# Patient Record
Sex: FEMALE | Race: WHITE | Employment: OTHER | URBAN - METROPOLITAN AREA
[De-identification: names, ages, dates, MRNs, and addresses within clinical notes are randomized per-mention and may not be internally consistent; named-entity substitution may affect disease eponyms.]

---

## 2023-09-11 ENCOUNTER — OFFICE VISIT (OUTPATIENT)
Dept: GERIATRIC MEDICINE | Age: 88
End: 2023-09-11
Payer: MEDICARE

## 2023-09-11 DIAGNOSIS — R53.1 WEAKNESS: ICD-10-CM

## 2023-09-11 DIAGNOSIS — F03.90 DEMENTIA WITHOUT BEHAVIORAL DISTURBANCE (HCC): ICD-10-CM

## 2023-09-11 DIAGNOSIS — G47.33 OSA (OBSTRUCTIVE SLEEP APNEA): ICD-10-CM

## 2023-09-11 DIAGNOSIS — I15.9 SECONDARY HYPERTENSION: ICD-10-CM

## 2023-09-11 DIAGNOSIS — N39.0 URINARY TRACT INFECTION WITHOUT HEMATURIA, SITE UNSPECIFIED: Primary | ICD-10-CM

## 2023-09-11 PROCEDURE — 1123F ACP DISCUSS/DSCN MKR DOCD: CPT | Performed by: INTERNAL MEDICINE

## 2023-09-11 PROCEDURE — 99306 1ST NF CARE HIGH MDM 50: CPT | Performed by: INTERNAL MEDICINE

## 2023-09-12 ENCOUNTER — OFFICE VISIT (OUTPATIENT)
Dept: GERIATRIC MEDICINE | Age: 88
End: 2023-09-12
Payer: MEDICARE

## 2023-09-12 DIAGNOSIS — I15.9 SECONDARY HYPERTENSION: ICD-10-CM

## 2023-09-12 DIAGNOSIS — G47.33 OSA (OBSTRUCTIVE SLEEP APNEA): ICD-10-CM

## 2023-09-12 DIAGNOSIS — R53.1 WEAKNESS: ICD-10-CM

## 2023-09-12 DIAGNOSIS — F03.90 DEMENTIA WITHOUT BEHAVIORAL DISTURBANCE (HCC): ICD-10-CM

## 2023-09-12 DIAGNOSIS — N39.0 URINARY TRACT INFECTION WITHOUT HEMATURIA, SITE UNSPECIFIED: Primary | ICD-10-CM

## 2023-09-12 PROCEDURE — 1123F ACP DISCUSS/DSCN MKR DOCD: CPT | Performed by: INTERNAL MEDICINE

## 2023-09-12 PROCEDURE — 99308 SBSQ NF CARE LOW MDM 20: CPT | Performed by: INTERNAL MEDICINE

## 2023-09-12 RX ORDER — METOPROLOL SUCCINATE 25 MG/1
25 TABLET, EXTENDED RELEASE ORAL DAILY
COMMUNITY

## 2023-09-12 RX ORDER — NITROFURANTOIN 25; 75 MG/1; MG/1
100 CAPSULE ORAL 2 TIMES DAILY
COMMUNITY

## 2023-09-12 RX ORDER — OMEPRAZOLE 20 MG/1
20 CAPSULE, DELAYED RELEASE ORAL DAILY
COMMUNITY

## 2023-09-12 RX ORDER — LANOLIN ALCOHOL/MO/W.PET/CERES
1000 CREAM (GRAM) TOPICAL DAILY
COMMUNITY

## 2023-09-13 ENCOUNTER — OFFICE VISIT (OUTPATIENT)
Dept: GERIATRIC MEDICINE | Age: 88
End: 2023-09-13
Payer: MEDICARE

## 2023-09-13 DIAGNOSIS — R53.1 WEAKNESS: ICD-10-CM

## 2023-09-13 DIAGNOSIS — I15.9 SECONDARY HYPERTENSION: ICD-10-CM

## 2023-09-13 DIAGNOSIS — N39.0 URINARY TRACT INFECTION WITHOUT HEMATURIA, SITE UNSPECIFIED: Primary | ICD-10-CM

## 2023-09-13 DIAGNOSIS — F03.90 DEMENTIA WITHOUT BEHAVIORAL DISTURBANCE (HCC): ICD-10-CM

## 2023-09-13 DIAGNOSIS — G47.33 OSA (OBSTRUCTIVE SLEEP APNEA): ICD-10-CM

## 2023-09-13 DIAGNOSIS — U07.1 COVID-19: ICD-10-CM

## 2023-09-13 PROCEDURE — 99309 SBSQ NF CARE MODERATE MDM 30: CPT | Performed by: INTERNAL MEDICINE

## 2023-09-13 PROCEDURE — 1123F ACP DISCUSS/DSCN MKR DOCD: CPT | Performed by: INTERNAL MEDICINE

## 2023-09-14 ENCOUNTER — OFFICE VISIT (OUTPATIENT)
Dept: GERIATRIC MEDICINE | Age: 88
End: 2023-09-14

## 2023-09-14 DIAGNOSIS — F03.90 DEMENTIA WITHOUT BEHAVIORAL DISTURBANCE (HCC): ICD-10-CM

## 2023-09-14 DIAGNOSIS — G47.33 OSA (OBSTRUCTIVE SLEEP APNEA): ICD-10-CM

## 2023-09-14 DIAGNOSIS — N39.0 URINARY TRACT INFECTION WITHOUT HEMATURIA, SITE UNSPECIFIED: Primary | ICD-10-CM

## 2023-09-14 DIAGNOSIS — U07.1 COVID-19: ICD-10-CM

## 2023-09-14 DIAGNOSIS — R53.1 WEAKNESS: ICD-10-CM

## 2023-09-14 DIAGNOSIS — I15.9 SECONDARY HYPERTENSION: ICD-10-CM

## 2023-09-15 ENCOUNTER — OFFICE VISIT (OUTPATIENT)
Dept: GERIATRIC MEDICINE | Age: 88
End: 2023-09-15

## 2023-09-15 DIAGNOSIS — I15.9 SECONDARY HYPERTENSION: ICD-10-CM

## 2023-09-15 DIAGNOSIS — G47.33 OSA (OBSTRUCTIVE SLEEP APNEA): ICD-10-CM

## 2023-09-15 DIAGNOSIS — U07.1 COVID-19: ICD-10-CM

## 2023-09-15 DIAGNOSIS — F03.90 DEMENTIA WITHOUT BEHAVIORAL DISTURBANCE (HCC): ICD-10-CM

## 2023-09-15 DIAGNOSIS — R53.1 WEAKNESS: ICD-10-CM

## 2023-09-15 DIAGNOSIS — N39.0 URINARY TRACT INFECTION WITHOUT HEMATURIA, SITE UNSPECIFIED: Primary | ICD-10-CM

## 2023-09-17 ENCOUNTER — OFFICE VISIT (OUTPATIENT)
Dept: GERIATRIC MEDICINE | Age: 88
End: 2023-09-17

## 2023-09-17 DIAGNOSIS — R53.1 WEAKNESS: ICD-10-CM

## 2023-09-17 DIAGNOSIS — F03.90 DEMENTIA WITHOUT BEHAVIORAL DISTURBANCE (HCC): ICD-10-CM

## 2023-09-17 DIAGNOSIS — U07.1 COVID-19: ICD-10-CM

## 2023-09-17 DIAGNOSIS — N39.0 URINARY TRACT INFECTION WITHOUT HEMATURIA, SITE UNSPECIFIED: Primary | ICD-10-CM

## 2023-09-17 DIAGNOSIS — I15.9 SECONDARY HYPERTENSION: ICD-10-CM

## 2023-09-17 DIAGNOSIS — G47.33 OSA (OBSTRUCTIVE SLEEP APNEA): ICD-10-CM

## 2023-09-18 ENCOUNTER — OFFICE VISIT (OUTPATIENT)
Dept: GERIATRIC MEDICINE | Age: 88
End: 2023-09-18

## 2023-09-18 DIAGNOSIS — F03.90 DEMENTIA WITHOUT BEHAVIORAL DISTURBANCE (HCC): ICD-10-CM

## 2023-09-18 DIAGNOSIS — U07.1 COVID-19: ICD-10-CM

## 2023-09-18 DIAGNOSIS — I15.9 SECONDARY HYPERTENSION: ICD-10-CM

## 2023-09-18 DIAGNOSIS — R53.1 WEAKNESS: ICD-10-CM

## 2023-09-18 DIAGNOSIS — N39.0 URINARY TRACT INFECTION WITHOUT HEMATURIA, SITE UNSPECIFIED: Primary | ICD-10-CM

## 2023-09-18 DIAGNOSIS — G47.33 OSA (OBSTRUCTIVE SLEEP APNEA): ICD-10-CM

## 2023-09-18 NOTE — PROGRESS NOTES
SNF PROGRESS NOTE      Cc- UTI      Patient is a Carlos Eduardo Riggs 80 y.o. female who is being seen at [facility] for UTI and confusion secondary to UTI. She is walking in the hallway with her walker. She is impulsive and confused. She gets up due to confusion secondary to change of environment.          Past Medical History:   Diagnosis Date    COPD (chronic obstructive pulmonary disease) ([address] )     Dementia ([address] [facility])     Hypertension     Macular degeneration     Osteoarthritis     Sleep apnea      Sulfa antibiotics    VS reviewed    Gen- Alert and oriented x 1   Heart- RRR no murmur no LE edema   Lungs- CTA b/l no resp distress RA oxygen   Abd- bs x 4           Assessment and Plan    UTI   On macrobid until 9/16/23  Weakness  PT OT   Dementia   HTN  Metoprolol   COPD  CARMITA   Wears Cpap      Matt Arriaga DO, [facility]     Electronically signed by: Anai Villaseñor DO on 9/12/2023

## 2023-09-18 NOTE — PROGRESS NOTES
AREDS PO) Take 1 capsule by mouth 2 times daily Indications: Nutritional Support    Historical Provider, MD   aspirin 81 MG tablet Take 1 tablet by mouth daily Indications: Preventative Treatment    Historical Provider, MD       Allergies:    Sulfa antibiotics    Social History:    reports that she has never smoked. She does not have any smokeless tobacco history on file. She reports current alcohol use. Family History:       Problem Relation Age of Onset    Cancer Sister        PHYSICAL EXAM:      Vitals were reviewed and stable     Gen- appears stated age. HENT- Head atraumtic, hearing intact, oral mucosa moist. Original dentition and fair. Eye- EOMI, No pale conjunctiva. No scleral icterus   Neck- No thyromegalgy. No JVD. Heart- RRR no murmur No LE edema  Lungs- CTA b/l no respiratory distress. RA oxygen   Abd- soft, Nontender, BS x 4   Musculoskel- muscle strength 5/5 UE bilaterally. 5/5 lower extremity bilaterally. Neuro- grossly intact. No acute deficits noted. No sensation disturbances. No facial droop   Skin- intact and dry. No rashes or ulcerations  Psych-  Mood and affect normal. Alert and oriented x 1          ASSESSMENT/ PLAN[de-identified]        UTI   On macrobid until 9/16/23  Weakness  PT OT   Dementia   HTN  Metoprolol   COPD  CARMITA   Wears Cpap           Sonia Cee DO [facility]     Electronically signed 9/18/2023        NOTE: This report was transcribed using voice recognition software. Every effort was made to ensure accuracy; however, inadvertent computerized transcription errors may be present.

## 2023-09-19 ENCOUNTER — OFFICE VISIT (OUTPATIENT)
Dept: GERIATRIC MEDICINE | Age: 88
End: 2023-09-19

## 2023-09-19 DIAGNOSIS — U07.1 COVID-19: ICD-10-CM

## 2023-09-19 DIAGNOSIS — F03.90 DEMENTIA WITHOUT BEHAVIORAL DISTURBANCE (HCC): ICD-10-CM

## 2023-09-19 DIAGNOSIS — R53.1 WEAKNESS: ICD-10-CM

## 2023-09-19 DIAGNOSIS — N39.0 URINARY TRACT INFECTION WITHOUT HEMATURIA, SITE UNSPECIFIED: Primary | ICD-10-CM

## 2023-09-19 DIAGNOSIS — I15.9 SECONDARY HYPERTENSION: ICD-10-CM

## 2023-09-19 DIAGNOSIS — G47.33 OSA (OBSTRUCTIVE SLEEP APNEA): ICD-10-CM

## 2023-09-20 ENCOUNTER — OFFICE VISIT (OUTPATIENT)
Dept: GERIATRIC MEDICINE | Age: 88
End: 2023-09-20

## 2023-09-20 DIAGNOSIS — G47.33 OSA (OBSTRUCTIVE SLEEP APNEA): ICD-10-CM

## 2023-09-20 DIAGNOSIS — I15.9 SECONDARY HYPERTENSION: ICD-10-CM

## 2023-09-20 DIAGNOSIS — U07.1 COVID-19: ICD-10-CM

## 2023-09-20 DIAGNOSIS — N39.0 URINARY TRACT INFECTION WITHOUT HEMATURIA, SITE UNSPECIFIED: Primary | ICD-10-CM

## 2023-09-20 DIAGNOSIS — R53.1 WEAKNESS: ICD-10-CM

## 2023-09-20 DIAGNOSIS — F03.90 DEMENTIA WITHOUT BEHAVIORAL DISTURBANCE (HCC): ICD-10-CM

## 2023-09-20 NOTE — PROGRESS NOTES
SNF PROGRESS NOTE      Cc- UTI/ weakness      Patient is a Earnest Rai 80 y.o. female who is being seen at [facility] for UTI and confusion secondary to UTI. She continues to be in isolation. She denies any SOB. Just feels tired. She continues to receive decadron. She remains confused. Past Medical History:   Diagnosis Date    COPD (chronic obstructive pulmonary disease) (HCC)     Dementia (HCC)     Hypertension     Macular degeneration     Osteoarthritis     Sleep apnea      Sulfa antibiotics    VS reviewed    Gen- Alert and oriented x 1   Heart- RRR no murmur no LE edema   Lungs- CTA b/l no resp distress RA oxygen   Abd- bs x 4           Assessment and Plan    COVID   Started on decadron x 10 days.    UTI   On macrobid until 9/16/23  Weakness  PT OT   Dementia   HTN  Metoprolol   COPD  CARMITA   Wears Cpap      Maxime Mendoza DO [facility]     Electronically signed by: Pamela Garibay DO on 9/14/2023

## 2023-09-21 NOTE — PROGRESS NOTES
SNF PROGRESS NOTE      Cc- UTI/ weakness      Patient is a Bartholome Kendrick 80 y.o. female who is being seen at [facility] for UTI and confusion secondary to UTI. Who continues on decadron. No SOB. She feels tired and remains confused. Past Medical History:   Diagnosis Date    COPD (chronic obstructive pulmonary disease) (HCC)     Dementia (HCC)     Hypertension     Macular degeneration     Osteoarthritis     Sleep apnea      Sulfa antibiotics    VS reviewed       Gen- Alert and oriented x 1   Heart- RRR no murmur no LE edema   Lungs- CTA b/l no resp distress RA oxygen   Abd- bs x 4          Assessment and Plan    COVID   Started on decadron x 10 days.    UTI   On macrobid until 9/16/23  Weakness  PT OT   Dementia   HTN  Metoprolol   COPD  CARMITA   Wears Cpap      Melecio Cadena DO [facility]     Electronically signed by: Hanny Jesus DO on 9/15/2023

## 2023-09-22 ENCOUNTER — OFFICE VISIT (OUTPATIENT)
Dept: GERIATRIC MEDICINE | Age: 88
End: 2023-09-22
Payer: MEDICARE

## 2023-09-22 DIAGNOSIS — G47.33 OSA (OBSTRUCTIVE SLEEP APNEA): ICD-10-CM

## 2023-09-22 DIAGNOSIS — R53.1 WEAKNESS: ICD-10-CM

## 2023-09-22 DIAGNOSIS — I15.9 SECONDARY HYPERTENSION: ICD-10-CM

## 2023-09-22 DIAGNOSIS — U07.1 COVID-19: ICD-10-CM

## 2023-09-22 DIAGNOSIS — N39.0 URINARY TRACT INFECTION WITHOUT HEMATURIA, SITE UNSPECIFIED: Primary | ICD-10-CM

## 2023-09-22 DIAGNOSIS — F03.90 DEMENTIA WITHOUT BEHAVIORAL DISTURBANCE (HCC): ICD-10-CM

## 2023-09-22 PROCEDURE — 99308 SBSQ NF CARE LOW MDM 20: CPT | Performed by: INTERNAL MEDICINE

## 2023-09-22 PROCEDURE — 1123F ACP DISCUSS/DSCN MKR DOCD: CPT | Performed by: INTERNAL MEDICINE

## 2023-09-22 NOTE — PROGRESS NOTES
SNF PROGRESS NOTE      Cc- UTI/ weakness      Patient is a Shanell Francois 80 y.o. female who is being seen at [facility] for UTI and confusion secondary to UTI. Patient is laying in bed. No complaints and no SOB. No CP. Past Medical History:   Diagnosis Date    COPD (chronic obstructive pulmonary disease) (HCC)     Dementia (HCC)     Hypertension     Macular degeneration     Osteoarthritis     Sleep apnea      Sulfa antibiotics    VS reviewed      Gen- Alert and oriented x 1   Heart- RRR no murmur no LE edema   Lungs- CTA b/l no resp distress RA oxygen   Abd- bs x 4        Assessment and Plan    COVID   Started on decadron x 10 days.    UTI   S/p macrobid  Weakness  PT OT   Dementia   HTN  Metoprolol   COPD  CARMITA   Wears Cpap      Bernardino Villatoro DO [facility]     Electronically signed by: Marizol Cruz DO on 9/17/2023

## 2023-09-23 ENCOUNTER — OFFICE VISIT (OUTPATIENT)
Dept: GERIATRIC MEDICINE | Age: 88
End: 2023-09-23
Payer: MEDICARE

## 2023-09-23 DIAGNOSIS — U07.1 COVID-19: ICD-10-CM

## 2023-09-23 DIAGNOSIS — N39.0 URINARY TRACT INFECTION WITHOUT HEMATURIA, SITE UNSPECIFIED: Primary | ICD-10-CM

## 2023-09-23 DIAGNOSIS — I15.9 SECONDARY HYPERTENSION: ICD-10-CM

## 2023-09-23 DIAGNOSIS — F03.90 DEMENTIA WITHOUT BEHAVIORAL DISTURBANCE (HCC): ICD-10-CM

## 2023-09-23 DIAGNOSIS — G47.33 OSA (OBSTRUCTIVE SLEEP APNEA): ICD-10-CM

## 2023-09-23 DIAGNOSIS — R53.1 WEAKNESS: ICD-10-CM

## 2023-09-23 PROCEDURE — 99308 SBSQ NF CARE LOW MDM 20: CPT | Performed by: INTERNAL MEDICINE

## 2023-09-23 PROCEDURE — 1123F ACP DISCUSS/DSCN MKR DOCD: CPT | Performed by: INTERNAL MEDICINE

## 2023-09-23 NOTE — PROGRESS NOTES
SNF PROGRESS NOTE      Cc- UTI/ weakness      Patient is a Franklyn Le 80 y.o. female who is being seen at [facility] for UTI and confusion secondary to UTI. Patient is laying in bed. She denies any sob. No chest pain. She is laying in bed. Past Medical History:   Diagnosis Date    COPD (chronic obstructive pulmonary disease) (HCC)     Dementia (HCC)     Hypertension     Macular degeneration     Osteoarthritis     Sleep apnea      Sulfa antibiotics    VS reviewed    Gen- Alert and oriented x 1   Heart- RRR no murmur no LE edema   Lungs- CTA b/l no resp distress RA oxygen   Abd- bs x 4          Assessment and Plan    COVID   On decadron 10 day course.     UTI   S/p macrobid  Weakness  PT OT   Dementia   HTN  Metoprolol   COPD  CARMITA   Wears Cpap      Wale Natarajan DO [facility]     Electronically signed by: Sindhu Luna DO on 9/19/2023

## 2023-09-23 NOTE — PROGRESS NOTES
SNF PROGRESS NOTE      Cc- UTI/ weakness      Patient is a Audra Disla 80 y.o. female who is being seen at [facility] for UTI and confusion secondary to UTI. Patient is laying in bed. She denies any SOB. She remains slightly confused. She has no fever. Past Medical History:   Diagnosis Date    COPD (chronic obstructive pulmonary disease) (HCC)     Dementia (HCC)     Hypertension     Macular degeneration     Osteoarthritis     Sleep apnea      Sulfa antibiotics    VS reviewed    Gen- Alert and oriented x 1   Heart- RRR no murmur no LE edema   Lungs- CTA b/l no resp distress RA oxygen   Abd- bs x 4          Assessment and Plan    COVID   On decadron 10 day course.     UTI   S/p macrobid  Weakness  PT OT   Dementia   HTN  Metoprolol   COPD  CARMITA   Wears Cpap      Cadence Cuadra DO [facility]     Electronically signed by: Abraham Montana DO on 9/18/2023

## 2023-09-24 ENCOUNTER — OFFICE VISIT (OUTPATIENT)
Dept: GERIATRIC MEDICINE | Age: 88
End: 2023-09-24
Payer: MEDICARE

## 2023-09-24 DIAGNOSIS — I15.9 SECONDARY HYPERTENSION: ICD-10-CM

## 2023-09-24 DIAGNOSIS — R53.1 WEAKNESS: ICD-10-CM

## 2023-09-24 DIAGNOSIS — F03.90 DEMENTIA WITHOUT BEHAVIORAL DISTURBANCE (HCC): ICD-10-CM

## 2023-09-24 DIAGNOSIS — N39.0 URINARY TRACT INFECTION WITHOUT HEMATURIA, SITE UNSPECIFIED: Primary | ICD-10-CM

## 2023-09-24 DIAGNOSIS — U07.1 COVID-19: ICD-10-CM

## 2023-09-24 DIAGNOSIS — G47.33 OSA (OBSTRUCTIVE SLEEP APNEA): ICD-10-CM

## 2023-09-24 PROCEDURE — 99308 SBSQ NF CARE LOW MDM 20: CPT | Performed by: INTERNAL MEDICINE

## 2023-09-24 PROCEDURE — 1123F ACP DISCUSS/DSCN MKR DOCD: CPT | Performed by: INTERNAL MEDICINE

## 2023-09-25 ENCOUNTER — OFFICE VISIT (OUTPATIENT)
Dept: GERIATRIC MEDICINE | Age: 88
End: 2023-09-25
Payer: MEDICARE

## 2023-09-25 DIAGNOSIS — G47.33 OSA (OBSTRUCTIVE SLEEP APNEA): ICD-10-CM

## 2023-09-25 DIAGNOSIS — U07.1 COVID-19: ICD-10-CM

## 2023-09-25 DIAGNOSIS — I15.9 SECONDARY HYPERTENSION: ICD-10-CM

## 2023-09-25 DIAGNOSIS — R53.1 WEAKNESS: ICD-10-CM

## 2023-09-25 DIAGNOSIS — N39.0 URINARY TRACT INFECTION WITHOUT HEMATURIA, SITE UNSPECIFIED: Primary | ICD-10-CM

## 2023-09-25 DIAGNOSIS — F03.90 DEMENTIA WITHOUT BEHAVIORAL DISTURBANCE (HCC): ICD-10-CM

## 2023-09-25 PROCEDURE — 99308 SBSQ NF CARE LOW MDM 20: CPT | Performed by: INTERNAL MEDICINE

## 2023-09-25 PROCEDURE — 1123F ACP DISCUSS/DSCN MKR DOCD: CPT | Performed by: INTERNAL MEDICINE

## 2023-09-25 NOTE — PROGRESS NOTES
SNF PROGRESS NOTE      Cc- UTI/ weakness      Patient is a Oleg Alicea 80 y.o. female  who is being seen at [facility] for UTI and confusion secondary to UTI. Patient remains in isolation for [facility]. She is on RA and feeling much better. Past Medical History:   Diagnosis Date    COPD (chronic obstructive pulmonary disease) (HCC)     Dementia (HCC)     Hypertension     Macular degeneration     Osteoarthritis     Sleep apnea      Sulfa antibiotics    VS reviewed    Gen- Alert and oriented x 1   Heart- RRR no murmur no LE edema   Lungs- CTA b/l no resp distress RA oxygen   Abd- bs x 4          Assessment and Plan    COVID   On decadron 10 day course. In isolation.    UTI   S/p macrobid  Weakness  PT OT   Dementia   HTN  Metoprolol   COPD  CARMITA   Wears Cpap      Ekaterina Speaker DO [facility]     Electronically signed by: Tiarra Monzon DO on 9/22/2023

## 2023-09-25 NOTE — PROGRESS NOTES
SNF PROGRESS NOTE      Cc- UTI/ weakness      Patient is a Jigna Siddiqui 80 y.o. female who is being seen at [facility] for UTI and confusion secondary to UTI. Patient came out of isolation for COVID. She is sitting at the bedside in the chair. She has her walker in front of her. Past Medical History:   Diagnosis Date    COPD (chronic obstructive pulmonary disease) (HCC)     Dementia (HCC)     Hypertension     Macular degeneration     Osteoarthritis     Sleep apnea      Sulfa antibiotics    VS reviewed    Gen- Alert and oriented x 1   Heart- RRR no murmur no LE edema   Lungs- CTA b/l no resp distress RA oxygen   Abd- bs x 4          Assessment and Plan    COVID   S/p decadron    S/p isolation   Asymptomatic.    UTI   S/p macrobid  Weakness  PT OT   Dementia   HTN  Metoprolol   COPD  CARMITA   Wears Cpap      Cindy Tejeda DO, [facility]     Electronically signed by: Paulo Valderrama DO on 9/23/2023

## 2023-09-25 NOTE — PROGRESS NOTES
SNF PROGRESS NOTE      Cc- UTI/ weakness      Patient is a Gurpreet Stephens 80 y.o. female who is being seen at [facility] for UTI and confusion secondary to UTI. Patient is sitting in the chair and out of isolation for COVID. She is pleasant and states that her appetite is good. Past Medical History:   Diagnosis Date    COPD (chronic obstructive pulmonary disease) (HCC)     Dementia (HCC)     Hypertension     Macular degeneration     Osteoarthritis     Sleep apnea      Sulfa antibiotics    VS reviewed    Gen- Alert and oriented x 1   Heart- RRR no murmur no LE edema   Lungs- CTA b/l no resp distress RA oxygen   Abd- bs x 4          Assessment and Plan      COVID   S/p decadron    S/p isolation   Asymptomatic.    UTI   S/p macrobid  Weakness  PT OT   Dementia   HTN  Metoprolol   COPD  CARMITA   Wears Cpap    Valeriano Wilcox DO [facility]     Electronically signed by: Pao Christianson DO on 9/24/2023

## 2023-09-26 ENCOUNTER — OFFICE VISIT (OUTPATIENT)
Dept: GERIATRIC MEDICINE | Age: 88
End: 2023-09-26
Payer: MEDICARE

## 2023-09-26 DIAGNOSIS — Z16.21 VRE (VANCOMYCIN-RESISTANT ENTEROCOCCI) INFECTION: Primary | ICD-10-CM

## 2023-09-26 DIAGNOSIS — F03.90 DEMENTIA WITHOUT BEHAVIORAL DISTURBANCE (HCC): ICD-10-CM

## 2023-09-26 DIAGNOSIS — N39.0 URINARY TRACT INFECTION WITHOUT HEMATURIA, SITE UNSPECIFIED: ICD-10-CM

## 2023-09-26 DIAGNOSIS — A49.1 VRE (VANCOMYCIN-RESISTANT ENTEROCOCCI) INFECTION: Primary | ICD-10-CM

## 2023-09-26 DIAGNOSIS — U07.1 COVID-19: ICD-10-CM

## 2023-09-26 DIAGNOSIS — R53.1 WEAKNESS: ICD-10-CM

## 2023-09-26 DIAGNOSIS — I15.9 SECONDARY HYPERTENSION: ICD-10-CM

## 2023-09-26 DIAGNOSIS — G47.33 OSA (OBSTRUCTIVE SLEEP APNEA): ICD-10-CM

## 2023-09-26 PROCEDURE — 1123F ACP DISCUSS/DSCN MKR DOCD: CPT | Performed by: INTERNAL MEDICINE

## 2023-09-26 PROCEDURE — 99309 SBSQ NF CARE MODERATE MDM 30: CPT | Performed by: INTERNAL MEDICINE

## 2023-09-26 NOTE — PROGRESS NOTES
SNF PROGRESS NOTE      Cc- UTI/ weakness      Patient is a Chalice Force 80 y.o. female who is being seen at [facility] for UTI and confusion secondary to UTI. Patient is sitting in the chair and pleasant. She states that she doesn't know what to do. She denies any pain and states that she is doing well. Past Medical History:   Diagnosis Date    COPD (chronic obstructive pulmonary disease) (HCC)     Dementia (HCC)     Hypertension     Macular degeneration     Osteoarthritis     Sleep apnea      Sulfa antibiotics    VS reviewed    Gen- Alert and oriented x 1   Heart- RRR no murmur no LE edema   Lungs- CTA b/l no resp distress RA oxygen   Abd- bs x 4          Assessment and Plan    COVID   S/p decadron    S/p isolation   Asymptomatic.    UTI   S/p macrobid  Weakness  PT OT   Dementia   HTN  Metoprolol   COPD  CARMITA   Wears Cpap      Kenny Fagan DO [facility]     Electronically signed by: Shanel Cazares DO on 9/25/2023

## 2023-09-26 NOTE — PROGRESS NOTES
SNF PROGRESS NOTE      Cc- UTI/ weakness      Patient is a Oleg Alicea 80 y.o. female is being seen at [facility] for UTI and confusion secondary to UTI. Patient is working in the therapy gym. Her urine culture just came back as VRE. It was sensitive to macrobid. Past Medical History:   Diagnosis Date    COPD (chronic obstructive pulmonary disease) (HCC)     Dementia (HCC)     Hypertension     Macular degeneration     Osteoarthritis     Sleep apnea      Sulfa antibiotics    VS reviewed      Gen- Alert and oriented x 1   Heart- RRR no murmur no LE edema   Lungs- CTA b/l no resp distress RA oxygen   Abd- bs x 4        Assessment and Plan      VRE   In isolation   Macrobid bid x 7 stop date 10/3  COVID   S/p decadron    S/p isolation   Asymptomatic.    UTI   S/p macrobid  Weakness  PT OT   Dementia   HTN  Metoprolol   COPD  CARMITA   Wears Cpap      Ekaterina Sanchez DO [facility]     Electronically signed by: Tiarra Monzon DO on 9/26/2023

## 2023-09-27 ENCOUNTER — OFFICE VISIT (OUTPATIENT)
Dept: GERIATRIC MEDICINE | Age: 88
End: 2023-09-27

## 2023-09-27 DIAGNOSIS — Z16.21 VRE (VANCOMYCIN-RESISTANT ENTEROCOCCI) INFECTION: Primary | ICD-10-CM

## 2023-09-27 DIAGNOSIS — G47.33 OSA (OBSTRUCTIVE SLEEP APNEA): ICD-10-CM

## 2023-09-27 DIAGNOSIS — I15.9 SECONDARY HYPERTENSION: ICD-10-CM

## 2023-09-27 DIAGNOSIS — N39.0 URINARY TRACT INFECTION WITHOUT HEMATURIA, SITE UNSPECIFIED: ICD-10-CM

## 2023-09-27 DIAGNOSIS — R53.1 WEAKNESS: ICD-10-CM

## 2023-09-27 DIAGNOSIS — F03.90 DEMENTIA WITHOUT BEHAVIORAL DISTURBANCE (HCC): ICD-10-CM

## 2023-09-27 DIAGNOSIS — A49.1 VRE (VANCOMYCIN-RESISTANT ENTEROCOCCI) INFECTION: Primary | ICD-10-CM

## 2023-09-27 DIAGNOSIS — U07.1 COVID-19: ICD-10-CM

## 2023-09-28 ENCOUNTER — OFFICE VISIT (OUTPATIENT)
Dept: GERIATRIC MEDICINE | Age: 88
End: 2023-09-28

## 2023-09-28 DIAGNOSIS — I15.9 SECONDARY HYPERTENSION: ICD-10-CM

## 2023-09-28 DIAGNOSIS — G47.33 OSA (OBSTRUCTIVE SLEEP APNEA): ICD-10-CM

## 2023-09-28 DIAGNOSIS — U07.1 COVID-19: ICD-10-CM

## 2023-09-28 DIAGNOSIS — A49.1 VRE (VANCOMYCIN-RESISTANT ENTEROCOCCI) INFECTION: Primary | ICD-10-CM

## 2023-09-28 DIAGNOSIS — Z16.21 VRE (VANCOMYCIN-RESISTANT ENTEROCOCCI) INFECTION: Primary | ICD-10-CM

## 2023-09-28 DIAGNOSIS — F03.90 DEMENTIA WITHOUT BEHAVIORAL DISTURBANCE (HCC): ICD-10-CM

## 2023-09-28 DIAGNOSIS — N39.0 URINARY TRACT INFECTION WITHOUT HEMATURIA, SITE UNSPECIFIED: ICD-10-CM

## 2023-09-28 DIAGNOSIS — R53.1 WEAKNESS: ICD-10-CM

## 2023-09-28 NOTE — PROGRESS NOTES
SNF PROGRESS NOTE      Cc- UTI/ weakness      Patient is a Becca Proffer 80 y.o. female being seen at [facility] for UTI and confusion secondary to UTI. Patient is working in the therapy gym. Her urine culture just came back as VRE. It was sensitive to macrobid. Patient is on contact isolation. She is sitting in the chair and frustrated that she has to be in isolation. Past Medical History:   Diagnosis Date    COPD (chronic obstructive pulmonary disease) (HCC)     Dementia (HCC)     Hypertension     Macular degeneration     Osteoarthritis     Sleep apnea      Sulfa antibiotics    VS reviewed    Gen- Alert and oriented x 1   Heart- RRR no murmur no LE edema   Lungs- CTA b/l no resp distress RA oxygen   Abd- bs x 4          Assessment and Plan    VRE UTI   In isolation -- per facility   Macrobid bid x 7 stop date 10/3  COVID   S/p decadron    S/p isolation   Asymptomatic.    Weakness  PT OT   Dementia   HTN  Metoprolol   COPD  CARMITA   Wears Cpap      Erik Winchester DO [facility]     Electronically signed by: Aguilar Molina DO on 9/27/2023

## 2023-09-29 ENCOUNTER — OFFICE VISIT (OUTPATIENT)
Dept: GERIATRIC MEDICINE | Age: 88
End: 2023-09-29

## 2023-09-29 DIAGNOSIS — R53.1 WEAKNESS: ICD-10-CM

## 2023-09-29 DIAGNOSIS — U07.1 COVID-19: ICD-10-CM

## 2023-09-29 DIAGNOSIS — N39.0 URINARY TRACT INFECTION WITHOUT HEMATURIA, SITE UNSPECIFIED: ICD-10-CM

## 2023-09-29 DIAGNOSIS — G47.33 OSA (OBSTRUCTIVE SLEEP APNEA): ICD-10-CM

## 2023-09-29 DIAGNOSIS — F03.90 DEMENTIA WITHOUT BEHAVIORAL DISTURBANCE (HCC): ICD-10-CM

## 2023-09-29 DIAGNOSIS — Z16.21 VRE (VANCOMYCIN-RESISTANT ENTEROCOCCI) INFECTION: Primary | ICD-10-CM

## 2023-09-29 DIAGNOSIS — I15.9 SECONDARY HYPERTENSION: ICD-10-CM

## 2023-09-29 DIAGNOSIS — A49.1 VRE (VANCOMYCIN-RESISTANT ENTEROCOCCI) INFECTION: Primary | ICD-10-CM

## 2023-09-29 NOTE — PROGRESS NOTES
SNF PROGRESS NOTE      Cc- UTI/ weakness      Patient is a Shanell Francois 80 y.o. femalebeing seen at [facility] for UTI and confusion secondary to UTI. Patient is walking in the hallway with therapy and her walker and is doing well. She ia in good spirits . Past Medical History:   Diagnosis Date    COPD (chronic obstructive pulmonary disease) (HCC)     Dementia (HCC)     Hypertension     Macular degeneration     Osteoarthritis     Sleep apnea      Sulfa antibiotics    VS reviewed      Gen- Alert and oriented x 1   Heart- RRR no murmur no LE edema   Lungs- CTA b/l no resp distress RA oxygen   Abd- bs x 4        Assessment and Plan    VRE UTI   Macrobid bid x 7 stop date 10/3  COVID   S/p decadron    S/p isolation   Asymptomatic.    Weakness  PT OT   Dementia   HTN  Metoprolol   COPD  CARMITA   Wears Cpap      Bernardino Villatoro DO [facility]     Electronically signed by: Marizol Cruz DO on 9/29/2023

## 2023-09-29 NOTE — PROGRESS NOTES
SNF PROGRESS NOTE      Cc- UTI/ weakness      Patient is a Chalice Force 80 y.o. female being seen at [facility] for UTI and confusion secondary to UTI. Patient is sitting in her room in the chair sleeping. She denies any complaints at this time and is pleasant. She states that she is eating well. Past Medical History:   Diagnosis Date    COPD (chronic obstructive pulmonary disease) (HCC)     Dementia (HCC)     Hypertension     Macular degeneration     Osteoarthritis     Sleep apnea      Sulfa antibiotics    VS reviewed    Gen- Alert and oriented x 1   Heart- RRR no murmur no LE edema   Lungs- CTA b/l no resp distress RA oxygen   Abd- bs x 4             Assessment and Plan    VRE UTI   In isolation -- per facility   Macrobid bid x 7 stop date 10/3  COVID   S/p decadron    S/p isolation   Asymptomatic.    Weakness  PT OT   Dementia   HTN  Metoprolol   COPD  CARMITA   Wears Cpap      Kenny Fagan DO [facility]     Electronically signed by: Shanel Cazares DO on 9/28/2023

## 2023-09-30 ENCOUNTER — OFFICE VISIT (OUTPATIENT)
Dept: GERIATRIC MEDICINE | Age: 88
End: 2023-09-30

## 2023-09-30 DIAGNOSIS — Z16.21 VRE (VANCOMYCIN-RESISTANT ENTEROCOCCI) INFECTION: Primary | ICD-10-CM

## 2023-09-30 DIAGNOSIS — N39.0 URINARY TRACT INFECTION WITHOUT HEMATURIA, SITE UNSPECIFIED: ICD-10-CM

## 2023-09-30 DIAGNOSIS — I15.9 SECONDARY HYPERTENSION: ICD-10-CM

## 2023-09-30 DIAGNOSIS — F03.90 DEMENTIA WITHOUT BEHAVIORAL DISTURBANCE (HCC): ICD-10-CM

## 2023-09-30 DIAGNOSIS — U07.1 COVID-19: ICD-10-CM

## 2023-09-30 DIAGNOSIS — A49.1 VRE (VANCOMYCIN-RESISTANT ENTEROCOCCI) INFECTION: Primary | ICD-10-CM

## 2023-09-30 DIAGNOSIS — R53.1 WEAKNESS: ICD-10-CM

## 2023-09-30 DIAGNOSIS — G47.33 OSA (OBSTRUCTIVE SLEEP APNEA): ICD-10-CM

## 2023-10-01 ENCOUNTER — OFFICE VISIT (OUTPATIENT)
Dept: GERIATRIC MEDICINE | Age: 88
End: 2023-10-01

## 2023-10-01 DIAGNOSIS — G47.33 OSA (OBSTRUCTIVE SLEEP APNEA): ICD-10-CM

## 2023-10-01 DIAGNOSIS — R53.1 WEAKNESS: ICD-10-CM

## 2023-10-01 DIAGNOSIS — I15.9 SECONDARY HYPERTENSION: ICD-10-CM

## 2023-10-01 DIAGNOSIS — A49.1 VRE (VANCOMYCIN-RESISTANT ENTEROCOCCI) INFECTION: Primary | ICD-10-CM

## 2023-10-01 DIAGNOSIS — F03.90 DEMENTIA WITHOUT BEHAVIORAL DISTURBANCE (HCC): ICD-10-CM

## 2023-10-01 DIAGNOSIS — Z16.21 VRE (VANCOMYCIN-RESISTANT ENTEROCOCCI) INFECTION: Primary | ICD-10-CM

## 2023-10-01 DIAGNOSIS — N39.0 URINARY TRACT INFECTION WITHOUT HEMATURIA, SITE UNSPECIFIED: ICD-10-CM

## 2023-10-01 DIAGNOSIS — U07.1 COVID-19: ICD-10-CM

## 2023-10-02 ENCOUNTER — OFFICE VISIT (OUTPATIENT)
Dept: GERIATRIC MEDICINE | Age: 88
End: 2023-10-02

## 2023-10-02 DIAGNOSIS — U07.1 COVID-19: ICD-10-CM

## 2023-10-02 DIAGNOSIS — Z16.21 VRE (VANCOMYCIN-RESISTANT ENTEROCOCCI) INFECTION: Primary | ICD-10-CM

## 2023-10-02 DIAGNOSIS — F03.90 DEMENTIA WITHOUT BEHAVIORAL DISTURBANCE (HCC): ICD-10-CM

## 2023-10-02 DIAGNOSIS — N39.0 URINARY TRACT INFECTION WITHOUT HEMATURIA, SITE UNSPECIFIED: ICD-10-CM

## 2023-10-02 DIAGNOSIS — G47.33 OSA (OBSTRUCTIVE SLEEP APNEA): ICD-10-CM

## 2023-10-02 DIAGNOSIS — I15.9 SECONDARY HYPERTENSION: ICD-10-CM

## 2023-10-02 DIAGNOSIS — A49.1 VRE (VANCOMYCIN-RESISTANT ENTEROCOCCI) INFECTION: Primary | ICD-10-CM

## 2023-10-02 DIAGNOSIS — R53.1 WEAKNESS: ICD-10-CM

## 2023-10-03 ENCOUNTER — OFFICE VISIT (OUTPATIENT)
Dept: GERIATRIC MEDICINE | Age: 88
End: 2023-10-03

## 2023-10-03 DIAGNOSIS — R53.1 WEAKNESS: ICD-10-CM

## 2023-10-03 DIAGNOSIS — U07.1 COVID-19: ICD-10-CM

## 2023-10-03 DIAGNOSIS — I15.9 SECONDARY HYPERTENSION: ICD-10-CM

## 2023-10-03 DIAGNOSIS — A49.1 VRE (VANCOMYCIN-RESISTANT ENTEROCOCCI) INFECTION: Primary | ICD-10-CM

## 2023-10-03 DIAGNOSIS — N39.0 URINARY TRACT INFECTION WITHOUT HEMATURIA, SITE UNSPECIFIED: ICD-10-CM

## 2023-10-03 DIAGNOSIS — Z16.21 VRE (VANCOMYCIN-RESISTANT ENTEROCOCCI) INFECTION: Primary | ICD-10-CM

## 2023-10-03 DIAGNOSIS — F03.90 DEMENTIA WITHOUT BEHAVIORAL DISTURBANCE (HCC): ICD-10-CM

## 2023-10-03 DIAGNOSIS — G47.33 OSA (OBSTRUCTIVE SLEEP APNEA): ICD-10-CM

## 2023-10-06 NOTE — PROGRESS NOTES
SNF PROGRESS NOTE      Cc- UTI/ weakness      Patient is a Justyna Camilo 80 y.o. female who is being seen at [facility] for UTI and confusion secondary to UTI. Patient is sitting in her room in the chair and is in good spirits. She is eating well. Past Medical History:   Diagnosis Date    COPD (chronic obstructive pulmonary disease) (HCC)     Dementia (HCC)     Hypertension     Macular degeneration     Osteoarthritis     Sleep apnea      Sulfa antibiotics    VS reviewed    Gen- Alert and oriented x 1   Heart- RRR no murmur no LE edema   Lungs- CTA b/l no resp distress RA oxygen   Abd- bs x 4        Assessment and Plan    VRE UTI   Macrobid bid x 7 stop date 10/3  COVID   S/p decadron    S/p isolation   Asymptomatic.    Weakness  PT OT   Dementia   HTN  Metoprolol   COPD  CARMITA   Wears Cpap         Mickey Butler DO [facility]     Electronically signed by: Rita Salas DO on 10/2/2023

## 2023-10-06 NOTE — PROGRESS NOTES
Discharge Summary       Discharge Disposition Independent Living     Discharge Condition  stable       Discharge time  33 min       Medications   Current Outpatient Medications   Medication Sig Dispense Refill    nitrofurantoin, macrocrystal-monohydrate, (MACROBID) 100 MG capsule Take 1 capsule by mouth 2 times daily Indications: Urinary Tract Infection      Magnesium Oxide (MAGNESIUM-OXIDE) 250 MG TABS tablet Take 1 tablet by mouth daily Indications: Nutritional Support      metoprolol succinate (TOPROL XL) 25 MG extended release tablet Take 1 tablet by mouth daily Indications: High Blood Pressure Disorder      omeprazole (PRILOSEC) 20 MG delayed release capsule Take 1 capsule by mouth daily Indications: Gastroesophageal Reflux Disease      vitamin B-12 (CYANOCOBALAMIN) 1000 MCG tablet Take 1 tablet by mouth daily Indications: Nutritional Support      vitamin D (CHOLECALCIFEROL) 25 MCG (1000 UT) TABS tablet Take 1 tablet by mouth daily Indications: Nutritional Support      Multiple Vitamins-Minerals (PRESERVISION AREDS PO) Take 1 capsule by mouth 2 times daily Indications: Nutritional Support      aspirin 81 MG tablet Take 1 tablet by mouth daily Indications: Preventative Treatment       No current facility-administered medications for this visit. Diet- regular     Activity as tolerated         Brief SNF Course--     This is an 80 y.o. female who was being seen for weakness. She ended up getting COVID 19 during her stay and was placed in isolation. She received decadron but was asymptomatic. She remained confused so a urine was sent on her, and this grew VRE. She was started on Macrobid for 7 days.            Discharge Diagnosis :    VRE UTI   COVID   Weakness  Dementia   HTN  COPD  CARMITA         Follow up --     PCP In 1 week         Naga Johnson DO [facility]     Electronically signed by: Shantanu Gaona DO on 10/3/2023

## 2023-10-06 NOTE — PROGRESS NOTES
SNF PROGRESS NOTE      Cc- UTI/ weakness      Patient is a Gurpreet Stephens 80 y.o. female who is being seen at [facility] for UTI and confusion secondary to UTI. Patient is eating well. She is actually using her walker to go to the bathroom. She is on RA. NO complaints other than she would like to leave her room. Past Medical History:   Diagnosis Date    COPD (chronic obstructive pulmonary disease) (HCC)     Dementia (HCC)     Hypertension     Macular degeneration     Osteoarthritis     Sleep apnea      Sulfa antibiotics    VS reviewed      Gen- Alert and oriented x 1   Heart- RRR no murmur no LE edema   Lungs- CTA b/l no resp distress RA oxygen   Abd- bs x 4        Assessment and Plan    VRE UTI   Macrobid bid x 7 stop date 10/3  COVID   S/p decadron    S/p isolation   Asymptomatic.    Weakness  PT OT   Dementia   HTN  Metoprolol   COPD  CARMITA   Wears Cpap      Valeriano Wilcox DO [facility]     Electronically signed by: Pao Christianson DO on 10/1/2023

## 2023-12-14 ENCOUNTER — APPOINTMENT (OUTPATIENT)
Dept: CARDIOLOGY | Facility: HOSPITAL | Age: 88
End: 2023-12-14
Payer: MEDICARE

## 2023-12-14 ENCOUNTER — HOSPITAL ENCOUNTER (EMERGENCY)
Facility: HOSPITAL | Age: 88
Discharge: HOME | End: 2023-12-14
Attending: STUDENT IN AN ORGANIZED HEALTH CARE EDUCATION/TRAINING PROGRAM
Payer: MEDICARE

## 2023-12-14 ENCOUNTER — APPOINTMENT (OUTPATIENT)
Dept: RADIOLOGY | Facility: HOSPITAL | Age: 88
End: 2023-12-14
Payer: MEDICARE

## 2023-12-14 VITALS
BODY MASS INDEX: 32.14 KG/M2 | TEMPERATURE: 98.1 F | SYSTOLIC BLOOD PRESSURE: 174 MMHG | OXYGEN SATURATION: 95 % | DIASTOLIC BLOOD PRESSURE: 80 MMHG | RESPIRATION RATE: 18 BRPM | WEIGHT: 200 LBS | HEART RATE: 80 BPM | HEIGHT: 66 IN

## 2023-12-14 DIAGNOSIS — M25.512 CHRONIC LEFT SHOULDER PAIN: ICD-10-CM

## 2023-12-14 DIAGNOSIS — N39.0 URINARY TRACT INFECTION WITHOUT HEMATURIA, SITE UNSPECIFIED: Primary | ICD-10-CM

## 2023-12-14 DIAGNOSIS — G89.29 CHRONIC LEFT SHOULDER PAIN: ICD-10-CM

## 2023-12-14 LAB
ALBUMIN SERPL BCP-MCNC: 3.9 G/DL (ref 3.4–5)
ALP SERPL-CCNC: 72 U/L (ref 33–136)
ALT SERPL W P-5'-P-CCNC: 8 U/L (ref 7–45)
ANION GAP SERPL CALC-SCNC: 11 MMOL/L (ref 10–20)
APPEARANCE UR: ABNORMAL
AST SERPL W P-5'-P-CCNC: 16 U/L (ref 9–39)
BACTERIA #/AREA URNS AUTO: ABNORMAL /HPF
BASOPHILS # BLD AUTO: 0.06 X10*3/UL (ref 0–0.1)
BASOPHILS NFR BLD AUTO: 0.8 %
BILIRUB SERPL-MCNC: 0.5 MG/DL (ref 0–1.2)
BILIRUB UR STRIP.AUTO-MCNC: NEGATIVE MG/DL
BUN SERPL-MCNC: 22 MG/DL (ref 6–23)
CALCIUM SERPL-MCNC: 9.5 MG/DL (ref 8.6–10.3)
CARDIAC TROPONIN I PNL SERPL HS: 7 NG/L (ref 0–13)
CHLORIDE SERPL-SCNC: 108 MMOL/L (ref 98–107)
CO2 SERPL-SCNC: 29 MMOL/L (ref 21–32)
COLOR UR: YELLOW
CREAT SERPL-MCNC: 0.76 MG/DL (ref 0.5–1.05)
EOSINOPHIL # BLD AUTO: 0.37 X10*3/UL (ref 0–0.4)
EOSINOPHIL NFR BLD AUTO: 4.9 %
ERYTHROCYTE [DISTWIDTH] IN BLOOD BY AUTOMATED COUNT: 14.1 % (ref 11.5–14.5)
GFR SERPL CREATININE-BSD FRML MDRD: 74 ML/MIN/1.73M*2
GLUCOSE SERPL-MCNC: 88 MG/DL (ref 74–99)
GLUCOSE UR STRIP.AUTO-MCNC: NEGATIVE MG/DL
HCT VFR BLD AUTO: 37.3 % (ref 36–46)
HGB BLD-MCNC: 11.9 G/DL (ref 12–16)
HOLD SPECIMEN: NORMAL
IMM GRANULOCYTES # BLD AUTO: 0.02 X10*3/UL (ref 0–0.5)
IMM GRANULOCYTES NFR BLD AUTO: 0.3 % (ref 0–0.9)
KETONES UR STRIP.AUTO-MCNC: NEGATIVE MG/DL
LACTATE SERPL-SCNC: 0.7 MMOL/L (ref 0.4–2)
LEUKOCYTE ESTERASE UR QL STRIP.AUTO: ABNORMAL
LYMPHOCYTES # BLD AUTO: 2.23 X10*3/UL (ref 0.8–3)
LYMPHOCYTES NFR BLD AUTO: 29.7 %
MCH RBC QN AUTO: 30.4 PG (ref 26–34)
MCHC RBC AUTO-ENTMCNC: 31.9 G/DL (ref 32–36)
MCV RBC AUTO: 95 FL (ref 80–100)
MONOCYTES # BLD AUTO: 0.77 X10*3/UL (ref 0.05–0.8)
MONOCYTES NFR BLD AUTO: 10.3 %
NEUTROPHILS # BLD AUTO: 4.05 X10*3/UL (ref 1.6–5.5)
NEUTROPHILS NFR BLD AUTO: 54 %
NITRITE UR QL STRIP.AUTO: POSITIVE
NRBC BLD-RTO: 0 /100 WBCS (ref 0–0)
PH UR STRIP.AUTO: 6 [PH]
PLATELET # BLD AUTO: 233 X10*3/UL (ref 150–450)
POTASSIUM SERPL-SCNC: 4.2 MMOL/L (ref 3.5–5.3)
PROT SERPL-MCNC: 6.6 G/DL (ref 6.4–8.2)
PROT UR STRIP.AUTO-MCNC: NEGATIVE MG/DL
RBC # BLD AUTO: 3.92 X10*6/UL (ref 4–5.2)
RBC # UR STRIP.AUTO: NEGATIVE /UL
RBC #/AREA URNS AUTO: ABNORMAL /HPF
SODIUM SERPL-SCNC: 144 MMOL/L (ref 136–145)
SP GR UR STRIP.AUTO: 1.02
SQUAMOUS #/AREA URNS AUTO: ABNORMAL /HPF
UROBILINOGEN UR STRIP.AUTO-MCNC: <2 MG/DL
WBC # BLD AUTO: 7.5 X10*3/UL (ref 4.4–11.3)
WBC #/AREA URNS AUTO: ABNORMAL /HPF

## 2023-12-14 PROCEDURE — 96365 THER/PROPH/DIAG IV INF INIT: CPT

## 2023-12-14 PROCEDURE — 83605 ASSAY OF LACTIC ACID: CPT | Performed by: STUDENT IN AN ORGANIZED HEALTH CARE EDUCATION/TRAINING PROGRAM

## 2023-12-14 PROCEDURE — 73030 X-RAY EXAM OF SHOULDER: CPT | Mod: LEFT SIDE | Performed by: RADIOLOGY

## 2023-12-14 PROCEDURE — 2500000004 HC RX 250 GENERAL PHARMACY W/ HCPCS (ALT 636 FOR OP/ED): Performed by: STUDENT IN AN ORGANIZED HEALTH CARE EDUCATION/TRAINING PROGRAM

## 2023-12-14 PROCEDURE — 87186 SC STD MICRODIL/AGAR DIL: CPT | Mod: ELYLAB | Performed by: STUDENT IN AN ORGANIZED HEALTH CARE EDUCATION/TRAINING PROGRAM

## 2023-12-14 PROCEDURE — 81001 URINALYSIS AUTO W/SCOPE: CPT | Performed by: STUDENT IN AN ORGANIZED HEALTH CARE EDUCATION/TRAINING PROGRAM

## 2023-12-14 PROCEDURE — 99284 EMERGENCY DEPT VISIT MOD MDM: CPT | Performed by: STUDENT IN AN ORGANIZED HEALTH CARE EDUCATION/TRAINING PROGRAM

## 2023-12-14 PROCEDURE — 73060 X-RAY EXAM OF HUMERUS: CPT | Mod: LT

## 2023-12-14 PROCEDURE — 80053 COMPREHEN METABOLIC PANEL: CPT | Performed by: STUDENT IN AN ORGANIZED HEALTH CARE EDUCATION/TRAINING PROGRAM

## 2023-12-14 PROCEDURE — 73030 X-RAY EXAM OF SHOULDER: CPT | Mod: LT,FY

## 2023-12-14 PROCEDURE — 85025 COMPLETE CBC W/AUTO DIFF WBC: CPT | Performed by: STUDENT IN AN ORGANIZED HEALTH CARE EDUCATION/TRAINING PROGRAM

## 2023-12-14 PROCEDURE — 87086 URINE CULTURE/COLONY COUNT: CPT | Mod: ELYLAB | Performed by: STUDENT IN AN ORGANIZED HEALTH CARE EDUCATION/TRAINING PROGRAM

## 2023-12-14 PROCEDURE — 84484 ASSAY OF TROPONIN QUANT: CPT | Performed by: STUDENT IN AN ORGANIZED HEALTH CARE EDUCATION/TRAINING PROGRAM

## 2023-12-14 PROCEDURE — 73060 X-RAY EXAM OF HUMERUS: CPT | Mod: LEFT SIDE | Performed by: RADIOLOGY

## 2023-12-14 PROCEDURE — 36415 COLL VENOUS BLD VENIPUNCTURE: CPT | Performed by: STUDENT IN AN ORGANIZED HEALTH CARE EDUCATION/TRAINING PROGRAM

## 2023-12-14 PROCEDURE — 93005 ELECTROCARDIOGRAM TRACING: CPT

## 2023-12-14 RX ORDER — CEPHALEXIN 500 MG/1
500 CAPSULE ORAL 3 TIMES DAILY
Qty: 21 CAPSULE | Refills: 0 | Status: SHIPPED | OUTPATIENT
Start: 2023-12-14 | End: 2023-12-21

## 2023-12-14 RX ORDER — CEFTRIAXONE 1 G/50ML
1 INJECTION, SOLUTION INTRAVENOUS ONCE
Status: COMPLETED | OUTPATIENT
Start: 2023-12-14 | End: 2023-12-14

## 2023-12-14 RX ADMIN — CEFTRIAXONE SODIUM 1 G: 1 INJECTION, SOLUTION INTRAVENOUS at 13:52

## 2023-12-14 ASSESSMENT — COLUMBIA-SUICIDE SEVERITY RATING SCALE - C-SSRS
2. HAVE YOU ACTUALLY HAD ANY THOUGHTS OF KILLING YOURSELF?: NO
6. HAVE YOU EVER DONE ANYTHING, STARTED TO DO ANYTHING, OR PREPARED TO DO ANYTHING TO END YOUR LIFE?: NO
1. IN THE PAST MONTH, HAVE YOU WISHED YOU WERE DEAD OR WISHED YOU COULD GO TO SLEEP AND NOT WAKE UP?: NO

## 2023-12-14 ASSESSMENT — PAIN DESCRIPTION - ORIENTATION: ORIENTATION: LEFT

## 2023-12-14 ASSESSMENT — LIFESTYLE VARIABLES
HAVE YOU EVER FELT YOU SHOULD CUT DOWN ON YOUR DRINKING: NO
EVER HAD A DRINK FIRST THING IN THE MORNING TO STEADY YOUR NERVES TO GET RID OF A HANGOVER: NO
REASON UNABLE TO ASSESS: NO
HAVE PEOPLE ANNOYED YOU BY CRITICIZING YOUR DRINKING: NO
EVER FELT BAD OR GUILTY ABOUT YOUR DRINKING: NO

## 2023-12-14 ASSESSMENT — PAIN SCALES - GENERAL: PAINLEVEL_OUTOF10: 6

## 2023-12-14 ASSESSMENT — PAIN DESCRIPTION - PAIN TYPE: TYPE: ACUTE PAIN

## 2023-12-14 ASSESSMENT — PAIN - FUNCTIONAL ASSESSMENT: PAIN_FUNCTIONAL_ASSESSMENT: 0-10

## 2023-12-14 NOTE — ED PROVIDER NOTES
HPI   Chief Complaint   Patient presents with    Shoulder Pain     Left shoulder pain x 2 years. Also, recently diagnosed with UTI, has not started antibiotic yet just got filled today, states she was feeling dizzy this morning.        This is a 89-year-old female presenting for evaluation of increasing confusion and left shoulder pain.  Patient was recently diagnosed with UTI as an outpatient and had Macrobid sent to the pharmacy, has not yet picked this up to start treatment.  Family reports increasing confusion and hallucinations which has happened in the past with UTIs.  No reported fevers.  Patient also complains of chronic left shoulder pain that has previously been evaluated by orthopedics.  Patient reports that she has frequent falls but does not recall injuring her shoulder directly.  Pain is worse with certain movements.      History provided by:  Patient and relative                      [place] Coma Scale Score: 15                  Patient History   No past medical history on file.  Past Surgical History:   Procedure Laterality Date    OTHER SURGICAL HISTORY  08/07/2020    Tonsillectomy    OTHER SURGICAL HISTORY  08/07/2020    Cholecystectomy laparoscopic    OTHER SURGICAL HISTORY  08/07/2020    Bladder surgery    OTHER SURGICAL HISTORY  08/07/2020    Breast surgery    OTHER SURGICAL HISTORY  08/07/2020    Hysterectomy    OTHER SURGICAL HISTORY  08/07/2020    Colonoscopy complete for polypectomy    OTHER SURGICAL HISTORY  08/07/2020    Esophagogastroduodenoscopy    OTHER SURGICAL HISTORY  11/03/2021    Excision of basal cell carcinoma    OTHER SURGICAL HISTORY  11/03/2021    Perineoplasty    OTHER SURGICAL HISTORY  11/03/2021    Intestinal surgery    OTHER SURGICAL HISTORY  11/03/2021    Incision & drainage    OTHER SURGICAL HISTORY  11/03/2021    Anterior colporrhaphy    OTHER SURGICAL HISTORY  11/03/2021    Cataract surgery    OTHER SURGICAL HISTORY  11/03/2021    Ablation    OTHER SURGICAL HISTORY   11/09/2021    Cardiac catheterization    OTHER SURGICAL HISTORY  11/03/2021    Rectal surgery    OTHER SURGICAL HISTORY  09/11/2020    Hernia repair     No family history on file.  Social History     Tobacco Use    Smoking status: Not on file    Smokeless tobacco: Not on file   Substance Use Topics    Alcohol use: Not on file    Drug use: Not on file       Physical Exam   ED Triage Vitals [12/14/23 1027]   Temp Heart Rate Resp BP   36.7 °C (98.1 °F) 68 18 (!) 187/83      SpO2 Temp Source Heart Rate Source Patient Position   95 % Temporal -- Sitting      BP Location FiO2 (%)     Right arm --       Physical Exam  Vitals and nursing note reviewed.   Constitutional:       General: She is not in acute distress.  HENT:      Head: Atraumatic.      Mouth/Throat:      Mouth: Mucous membranes are moist.      Pharynx: Oropharynx is clear.   Eyes:      Extraocular Movements: Extraocular movements intact.      Conjunctiva/sclera: Conjunctivae normal.      Pupils: Pupils are equal, round, and reactive to light.   Cardiovascular:      Rate and Rhythm: Normal rate and regular rhythm.      Pulses: Normal pulses.   Pulmonary:      Effort: Pulmonary effort is normal. No respiratory distress.      Breath sounds: Normal breath sounds.   Abdominal:      General: There is no distension.      Palpations: Abdomen is soft.      Tenderness: There is no abdominal tenderness. There is no guarding or rebound.   Musculoskeletal:         General: No deformity.      Cervical back: Neck supple.   Skin:     General: Skin is warm and dry.   Neurological:      Mental Status: She is alert and oriented to person, place, and time. Mental status is at baseline.      Cranial Nerves: No cranial nerve deficit.      Sensory: No sensory deficit.      Motor: No weakness.   Psychiatric:         Mood and Affect: Mood normal.         Behavior: Behavior normal.         ED Course & MDM   Diagnoses as of 12/14/23 1348   Urinary tract infection without hematuria, site  unspecified   Chronic left shoulder pain     Labs Reviewed   CBC WITH AUTO DIFFERENTIAL - Abnormal       Result Value    WBC 7.5      nRBC 0.0      RBC 3.92 (*)     Hemoglobin 11.9 (*)     Hematocrit 37.3      MCV 95      MCH 30.4      MCHC 31.9 (*)     RDW 14.1      Platelets 233      Neutrophils % 54.0      Immature Granulocytes %, Automated 0.3      Lymphocytes % 29.7      Monocytes % 10.3      Eosinophils % 4.9      Basophils % 0.8      Neutrophils Absolute 4.05      Immature Granulocytes Absolute, Automated 0.02      Lymphocytes Absolute 2.23      Monocytes Absolute 0.77      Eosinophils Absolute 0.37      Basophils Absolute 0.06     COMPREHENSIVE METABOLIC PANEL - Abnormal    Glucose 88      Sodium 144      Potassium 4.2      Chloride 108 (*)     Bicarbonate 29      Anion Gap 11      Urea Nitrogen 22      Creatinine 0.76      eGFR 74      Calcium 9.5      Albumin 3.9      Alkaline Phosphatase 72      Total Protein 6.6      AST 16      Bilirubin, Total 0.5      ALT 8     URINALYSIS WITH REFLEX MICROSCOPIC AND CULTURE - Abnormal    Color, Urine Yellow      Appearance, Urine Hazy (*)     Specific Gravity, Urine 1.019      pH, Urine 6.0      Protein, Urine NEGATIVE      Glucose, Urine NEGATIVE      Blood, Urine NEGATIVE      Ketones, Urine NEGATIVE      Bilirubin, Urine NEGATIVE      Urobilinogen, Urine <2.0      Nitrite, Urine POSITIVE (*)     Leukocyte Esterase, Urine MODERATE (2+) (*)    MICROSCOPIC ONLY, URINE - Abnormal    WBC, Urine 21-50 (*)     RBC, Urine 3-5      Squamous Epithelial Cells, Urine 1-9 (SPARSE)      Bacteria, Urine 1+ (*)    LACTATE - Normal    Lactate 0.7      Narrative:     Venipuncture immediately after or during the administration of Metamizole may lead to falsely low results. Testing should be performed immediately  prior to Metamizole dosing.   TROPONIN I, HIGH SENSITIVITY - Normal    Troponin I, High Sensitivity 7      Narrative:     Less than 99th percentile of normal range  cutoff-  Female and children under 18 years old <14 ng/L; Male <21 ng/L: Negative  Repeat testing should be performed if clinically indicated.     Female and children under 18 years old 14-50 ng/L; Male 21-50 ng/L:  Consistent with possible cardiac damage and possible increased clinical   risk. Serial measurements may help to assess extent of myocardial damage.     >50 ng/L: Consistent with cardiac damage, increased clinical risk and  myocardial infarction. Serial measurements may help assess extent of   myocardial damage.      NOTE: Children less than 1 year old may have higher baseline troponin   levels and results should be interpreted in conjunction with the overall   clinical context.     NOTE: Troponin I testing is performed using a different   testing methodology at [facility] than at other   [facility]. Direct result comparisons should only   be made within the same method.   URINE CULTURE   URINALYSIS WITH REFLEX MICROSCOPIC AND CULTURE    Narrative:     The following orders were created for panel order Urinalysis with Reflex Microscopic and Culture.  Procedure                               Abnormality         Status                     ---------                               -----------         ------                     Urinalysis with Reflex M...[[id number]]  Abnormal            Final result               Extra Urine Gray Tube[[id number]]                            In process                   Please view results for these tests on the individual orders.   EXTRA URINE GRAY TUBE     XR humerus left   Final Result   Negative exam.        Signed by: Leopoldo Rawls 12/14/2023 12:35 PM   Dictation workstation:   [id number]      XR shoulder left 2+ views   Final Result   Mild left glenohumeral joint arthritis.        Spinal degenerative changes.        MACRO:   None        Signed by: Elvis Denton 12/14/2023 12:26 PM   Dictation workstation:   [id number]          Medical Decision  Making  89-year-old female with increasing confusion and likely UTI.  Also complaining of chronic shoulder pain.  Given patient's frequent falls, will obtain x-rays of the left shoulder and left humerus to rule out acute fracture or dislocation.  On exam patient is awake and alert, no acute distress.  Mildly hypertensive but otherwise hemodynamically stable and afebrile.  Basic lab work and urinalysis with culture obtained.    Patient's workup is overall unremarkable other than UTI.  Patient will be given ceftriaxone 1 g IV while in the ED.  Will discharge patient with Keflex p.o. for continued treatment of UTI.  No evidence of sepsis.  Patient to continue follow-up with primary care as outpatient.  Return for new or worsening symptoms.    Amount and/or Complexity of Data Reviewed  Labs: ordered. Decision-making details documented in ED Course.     Details: Labwork reviewed.  CBC is unremarkable with no leukocytosis, stable H&H.  Metabolic panel with normal renal function, no major electrolyte derangements.  Urinalysis consistent with UTI.  Lactate within normal limits, normal troponin.  Radiology: ordered. Decision-making details documented in ED Course.     Details: X-rays of the left shoulder and humerus were reviewed and do not demonstrate any acute findings.  Show findings consistent with osteoarthritis.  ECG/medicine tests: ordered and independent interpretation performed. Decision-making details documented in ED Course.     Details: Twelve-lead ECG obtained at 1119 by my interpretation demonstrates sinus rhythm with sinus arrhythmia, rate of 60, no acute ST elevation or depression.  No other acute ischemic changes.    Risk  Prescription drug management.        Procedure  Procedures     Eric Huerta MD  12/14/23 0214

## 2023-12-16 LAB — BACTERIA UR CULT: ABNORMAL

## 2023-12-17 ENCOUNTER — TELEPHONE (OUTPATIENT)
Dept: PHARMACY | Facility: HOSPITAL | Age: 88
End: 2023-12-17
Payer: COMMERCIAL

## 2023-12-17 NOTE — PROGRESS NOTES
"EDPD Note: Antibiotics Reviewed and Warranted    Contacted Ms. Brynn Lyons regarding a positive urine culture/result that was taken during their recent emergency room visit. I completed education with patient . The patient is being treated appropriately with cephalexin 500mg TID x 7 days.     Patient presented to the ED with shoulder pain, confusion hallucinations, and falls,   Patient just started taking cephalexin yesterday but reports having diarrhea. She isn't sure if the diarrhea is from the antibiotic or food. However, she doesn't typically handle organizing them regularly, so she seemed confused about when she has been taking her meds. A \"pill lady\" comes twice daily, but patient reports she knows about the antibiotic.  Reports that she was in a nursing home and discharged due to difficulties with environment but was diagnosed with a UTI at that time but did not start antibiotic treatment. She currently is in the [facility]. She denied urinary symptoms and flank pain. We discussed UTI symptoms, preventative measures, and to stay hydrated while monitoring her diarrhea along with possible causes. She has a follow-up with Dr. Grant on Thursday and will follow-up with her.     Susceptibility data from last 90 days.  Collected Specimen Info Organism Amoxicillin/Clavulanate Ampicillin Ampicillin/Sulbactam Cefazolin Cefazolin (uncomplicated UTIs only) Ciprofloxacin Gentamicin Nitrofurantoin Piperacillin/Tazobactam Trimethoprim/Sulfamethoxazole   12/14/23 Urine from Clean Catch/Voided Escherichia coli S R I S S S S S S S        No further follow up needed from EDPD Team.     Mora Toribio, PharmD  "

## 2024-05-11 ENCOUNTER — APPOINTMENT (OUTPATIENT)
Dept: RADIOLOGY | Facility: HOSPITAL | Age: 89
End: 2024-05-11
Payer: MEDICARE

## 2024-05-11 ENCOUNTER — HOSPITAL ENCOUNTER (EMERGENCY)
Facility: HOSPITAL | Age: 89
Discharge: HOME | End: 2024-05-11
Payer: MEDICARE

## 2024-05-11 VITALS
TEMPERATURE: 98.1 F | RESPIRATION RATE: 18 BRPM | HEART RATE: 60 BPM | WEIGHT: 194 LBS | SYSTOLIC BLOOD PRESSURE: 164 MMHG | HEIGHT: 66 IN | BODY MASS INDEX: 31.18 KG/M2 | OXYGEN SATURATION: 95 % | DIASTOLIC BLOOD PRESSURE: 78 MMHG

## 2024-05-11 DIAGNOSIS — R91.1 PULMONARY NODULE: ICD-10-CM

## 2024-05-11 DIAGNOSIS — S22.41XA CLOSED FRACTURE OF MULTIPLE RIBS OF RIGHT SIDE, INITIAL ENCOUNTER: Primary | ICD-10-CM

## 2024-05-11 LAB
ANION GAP SERPL CALC-SCNC: 9 MMOL/L (ref 10–20)
BASOPHILS # BLD AUTO: 0.06 X10*3/UL (ref 0–0.1)
BASOPHILS NFR BLD AUTO: 0.7 %
BUN SERPL-MCNC: 16 MG/DL (ref 6–23)
CALCIUM SERPL-MCNC: 9.2 MG/DL (ref 8.6–10.3)
CHLORIDE SERPL-SCNC: 109 MMOL/L (ref 98–107)
CO2 SERPL-SCNC: 26 MMOL/L (ref 21–32)
CREAT SERPL-MCNC: 0.95 MG/DL (ref 0.5–1.05)
EGFRCR SERPLBLD CKD-EPI 2021: 57 ML/MIN/1.73M*2
EOSINOPHIL # BLD AUTO: 0.5 X10*3/UL (ref 0–0.4)
EOSINOPHIL NFR BLD AUTO: 5.9 %
ERYTHROCYTE [DISTWIDTH] IN BLOOD BY AUTOMATED COUNT: 13.8 % (ref 11.5–14.5)
GLUCOSE SERPL-MCNC: 116 MG/DL (ref 74–99)
HCT VFR BLD AUTO: 37.9 % (ref 36–46)
HGB BLD-MCNC: 12.4 G/DL (ref 12–16)
IMM GRANULOCYTES # BLD AUTO: 0.03 X10*3/UL (ref 0–0.5)
IMM GRANULOCYTES NFR BLD AUTO: 0.4 % (ref 0–0.9)
LYMPHOCYTES # BLD AUTO: 2.04 X10*3/UL (ref 0.8–3)
LYMPHOCYTES NFR BLD AUTO: 23.9 %
MCH RBC QN AUTO: 31.1 PG (ref 26–34)
MCHC RBC AUTO-ENTMCNC: 32.7 G/DL (ref 32–36)
MCV RBC AUTO: 95 FL (ref 80–100)
MONOCYTES # BLD AUTO: 0.75 X10*3/UL (ref 0.05–0.8)
MONOCYTES NFR BLD AUTO: 8.8 %
NEUTROPHILS # BLD AUTO: 5.16 X10*3/UL (ref 1.6–5.5)
NEUTROPHILS NFR BLD AUTO: 60.3 %
NRBC BLD-RTO: 0 /100 WBCS (ref 0–0)
PLATELET # BLD AUTO: 246 X10*3/UL (ref 150–450)
POTASSIUM SERPL-SCNC: 3.6 MMOL/L (ref 3.5–5.3)
RBC # BLD AUTO: 3.99 X10*6/UL (ref 4–5.2)
SODIUM SERPL-SCNC: 140 MMOL/L (ref 136–145)
WBC # BLD AUTO: 8.5 X10*3/UL (ref 4.4–11.3)

## 2024-05-11 PROCEDURE — 2550000001 HC RX 255 CONTRASTS: Performed by: PHYSICIAN ASSISTANT

## 2024-05-11 PROCEDURE — 70450 CT HEAD/BRAIN W/O DYE: CPT

## 2024-05-11 PROCEDURE — 96374 THER/PROPH/DIAG INJ IV PUSH: CPT | Performed by: PHYSICIAN ASSISTANT

## 2024-05-11 PROCEDURE — 74177 CT ABD & PELVIS W/CONTRAST: CPT

## 2024-05-11 PROCEDURE — 72125 CT NECK SPINE W/O DYE: CPT

## 2024-05-11 PROCEDURE — 2500000004 HC RX 250 GENERAL PHARMACY W/ HCPCS (ALT 636 FOR OP/ED): Performed by: PHYSICIAN ASSISTANT

## 2024-05-11 PROCEDURE — 72128 CT CHEST SPINE W/O DYE: CPT | Mod: RCN

## 2024-05-11 PROCEDURE — 72128 CT CHEST SPINE W/O DYE: CPT | Performed by: RADIOLOGY

## 2024-05-11 PROCEDURE — 72131 CT LUMBAR SPINE W/O DYE: CPT | Mod: RCN

## 2024-05-11 PROCEDURE — 70450 CT HEAD/BRAIN W/O DYE: CPT | Performed by: RADIOLOGY

## 2024-05-11 PROCEDURE — 99285 EMERGENCY DEPT VISIT HI MDM: CPT | Mod: 25 | Performed by: PHYSICIAN ASSISTANT

## 2024-05-11 PROCEDURE — 71260 CT THORAX DX C+: CPT | Performed by: RADIOLOGY

## 2024-05-11 PROCEDURE — 85025 COMPLETE CBC W/AUTO DIFF WBC: CPT | Performed by: PHYSICIAN ASSISTANT

## 2024-05-11 PROCEDURE — 72125 CT NECK SPINE W/O DYE: CPT | Performed by: RADIOLOGY

## 2024-05-11 PROCEDURE — 82374 ASSAY BLOOD CARBON DIOXIDE: CPT | Performed by: PHYSICIAN ASSISTANT

## 2024-05-11 PROCEDURE — 96375 TX/PRO/DX INJ NEW DRUG ADDON: CPT | Performed by: PHYSICIAN ASSISTANT

## 2024-05-11 PROCEDURE — 74177 CT ABD & PELVIS W/CONTRAST: CPT | Performed by: RADIOLOGY

## 2024-05-11 PROCEDURE — 72131 CT LUMBAR SPINE W/O DYE: CPT | Performed by: RADIOLOGY

## 2024-05-11 PROCEDURE — 36415 COLL VENOUS BLD VENIPUNCTURE: CPT | Performed by: PHYSICIAN ASSISTANT

## 2024-05-11 RX ORDER — NALOXONE HYDROCHLORIDE 4 MG/.1ML
4 SPRAY NASAL AS NEEDED
Qty: 2 EACH | Refills: 0 | Status: SHIPPED | OUTPATIENT
Start: 2024-05-11

## 2024-05-11 RX ORDER — HYDROCODONE BITARTRATE AND ACETAMINOPHEN 5; 325 MG/1; MG/1
1 TABLET ORAL 2 TIMES DAILY
Qty: 6 TABLET | Refills: 0 | Status: SHIPPED | OUTPATIENT
Start: 2024-05-11 | End: 2024-05-14

## 2024-05-11 RX ORDER — MORPHINE SULFATE 2 MG/ML
2 INJECTION, SOLUTION INTRAMUSCULAR; INTRAVENOUS ONCE
Status: COMPLETED | OUTPATIENT
Start: 2024-05-11 | End: 2024-05-11

## 2024-05-11 RX ORDER — LIDOCAINE 50 MG/G
1 PATCH TOPICAL DAILY
Qty: 15 PATCH | Refills: 0 | Status: SHIPPED | OUTPATIENT
Start: 2024-05-11

## 2024-05-11 RX ORDER — ONDANSETRON HYDROCHLORIDE 2 MG/ML
4 INJECTION, SOLUTION INTRAVENOUS ONCE
Status: COMPLETED | OUTPATIENT
Start: 2024-05-11 | End: 2024-05-11

## 2024-05-11 RX ADMIN — IOHEXOL 100 ML: 350 INJECTION, SOLUTION INTRAVENOUS at 15:17

## 2024-05-11 RX ADMIN — MORPHINE SULFATE 2 MG: 2 INJECTION, SOLUTION INTRAMUSCULAR; INTRAVENOUS at 15:02

## 2024-05-11 RX ADMIN — ONDANSETRON 4 MG: 2 INJECTION INTRAMUSCULAR; INTRAVENOUS at 15:02

## 2024-05-11 ASSESSMENT — COLUMBIA-SUICIDE SEVERITY RATING SCALE - C-SSRS
1. IN THE PAST MONTH, HAVE YOU WISHED YOU WERE DEAD OR WISHED YOU COULD GO TO SLEEP AND NOT WAKE UP?: NO
2. HAVE YOU ACTUALLY HAD ANY THOUGHTS OF KILLING YOURSELF?: NO
6. HAVE YOU EVER DONE ANYTHING, STARTED TO DO ANYTHING, OR PREPARED TO DO ANYTHING TO END YOUR LIFE?: NO

## 2024-05-11 ASSESSMENT — LIFESTYLE VARIABLES
TOTAL SCORE: 0
EVER HAD A DRINK FIRST THING IN THE MORNING TO STEADY YOUR NERVES TO GET RID OF A HANGOVER: NO
EVER FELT BAD OR GUILTY ABOUT YOUR DRINKING: NO
HAVE PEOPLE ANNOYED YOU BY CRITICIZING YOUR DRINKING: NO
HAVE YOU EVER FELT YOU SHOULD CUT DOWN ON YOUR DRINKING: NO

## 2024-05-11 ASSESSMENT — PAIN SCALES - GENERAL
PAINLEVEL_OUTOF10: 8
PAINLEVEL_OUTOF10: 4

## 2024-05-11 ASSESSMENT — PAIN - FUNCTIONAL ASSESSMENT
PAIN_FUNCTIONAL_ASSESSMENT: 0-10
PAIN_FUNCTIONAL_ASSESSMENT: 0-10

## 2024-05-11 NOTE — ED PROVIDER NOTES
"HPI   Chief Complaint   Patient presents with    Fall     \" I lost my balance going down to  a sock while walking without my walker and went back.\"  Having right middle upper back pain from hitting the floor.\"       A 89-year-old female patient comes in the emergency department today with complaints of back pain after falling 2 days ago.  She states that she supposed to use a walker as she is has an unsteady gait but was not using it inside to bend down and  something off the floor.  In doing so she fell backward hitting her back on the corner of the bed.  States she been having pain ever since.  Pain is worse with movement, breathing.  States when she sits still the pain is a 0-10 on the pain scale when she moves it is an 8 to a 10 out of 10 on the pain scale.  Described as sharp and achy.  This purpose comes in the emergency department today for further evaluation.  Otherwise no other complaints at this present time.                          [place] Coma Scale Score: 15                     Patient History   History reviewed. No pertinent past medical history.  Past Surgical History:   Procedure Laterality Date    OTHER SURGICAL HISTORY  08/07/2020    Tonsillectomy    OTHER SURGICAL HISTORY  08/07/2020    Cholecystectomy laparoscopic    OTHER SURGICAL HISTORY  08/07/2020    Bladder surgery    OTHER SURGICAL HISTORY  08/07/2020    Breast surgery    OTHER SURGICAL HISTORY  08/07/2020    Hysterectomy    OTHER SURGICAL HISTORY  08/07/2020    Colonoscopy complete for polypectomy    OTHER SURGICAL HISTORY  08/07/2020    Esophagogastroduodenoscopy    OTHER SURGICAL HISTORY  11/03/2021    Excision of basal cell carcinoma    OTHER SURGICAL HISTORY  11/03/2021    Perineoplasty    OTHER SURGICAL HISTORY  11/03/2021    Intestinal surgery    OTHER SURGICAL HISTORY  11/03/2021    Incision & drainage    OTHER SURGICAL HISTORY  11/03/2021    Anterior colporrhaphy    OTHER SURGICAL HISTORY  11/03/2021    Cataract " surgery    OTHER SURGICAL HISTORY  11/03/2021    Ablation    OTHER SURGICAL HISTORY  11/09/2021    Cardiac catheterization    OTHER SURGICAL HISTORY  11/03/2021    Rectal surgery    OTHER SURGICAL HISTORY  09/11/2020    Hernia repair     No family history on file.  Social History     Tobacco Use    Smoking status: Never    Smokeless tobacco: Never   Vaping Use    Vaping status: Never Used   Substance Use Topics    Alcohol use: Never    Drug use: Never       Physical Exam   ED Triage Vitals [05/11/24 1315]   Temperature Heart Rate Respirations BP   36.7 °C (98.1 °F) 61 18 155/83      Pulse Ox Temp Source Heart Rate Source Patient Position   95 % Temporal Monitor Sitting      BP Location FiO2 (%)     Right arm --       Physical Exam  Constitutional:       General: She is in acute distress (Moderate).      Appearance: Normal appearance. She is not ill-appearing or diaphoretic.   HENT:      Head: Normocephalic and atraumatic.      Nose: Nose normal.   Eyes:      Extraocular Movements: Extraocular movements intact.      Conjunctiva/sclera: Conjunctivae normal.      Pupils: Pupils are equal, round, and reactive to light.   Cardiovascular:      Rate and Rhythm: Normal rate and regular rhythm.   Pulmonary:      Effort: Pulmonary effort is normal. No respiratory distress.      Breath sounds: Normal breath sounds. No stridor. No wheezing.   Musculoskeletal:         General: Tenderness (Right posterior ribs) present. Normal range of motion.      Cervical back: Normal range of motion.   Skin:     General: Skin is warm and dry.      Findings: No bruising.   Neurological:      General: No focal deficit present.      Mental Status: She is alert and oriented to person, place, and time. Mental status is at baseline.   Psychiatric:         Mood and Affect: Mood normal.         ED Course & MDM   Diagnoses as of 05/11/24 1643   Closed fracture of multiple ribs of right side, initial encounter   Pulmonary nodule       Medical Decision  Making  A 89-year-old female patient comes in the emergency department today with complaints of back pain after falling 2 days ago.  She states that she supposed to use a walker as she is has an unsteady gait but was not using it inside to bend down and  something off the floor.  In doing so she fell backward hitting her back on the corner of the bed.  States she been having pain ever since.  Pain is worse with movement, breathing.  States when she sits still the pain is a 0-10 on the pain scale when she moves it is an 8 to a 10 out of 10 on the pain scale.  Described as sharp and achy.  This purpose comes in the emergency department today for further evaluation.  Otherwise no other complaints at this present time.    Wade scan ordered for the patient including CT head, CT spine, CT chest abdomen pelvis.  Rotating acute intracranial bleed, brain edema, calvarial fracture, spinal injury or fracture as well as rib fractures, pneumothorax, pulmonary congestion, pneumonia, intrathoracic or intra-abdominal hemorrhage or abnormality.  IV morphine IV Zofran ordered for the patient.    Patient has findings of acute fractures of ninth 10th ribs on the right.  This consistent with areas patient pain and clinical examination.  Patient otherwise has no other acute findings.  Finding of pulmonary nodule.  I discussed findings with patient and patient's daughter.  They feel very comfortable being discharged home.  Will have nurse give them an incentive spirometer.  Will give patient pain medications for home.  Patient agrees with this plan expressed full verbal understanding.  All questions were answered.    Historians the patient    Diagnosis: Acute right rib fractures 1 pulmonary nodule      Labs Reviewed   CBC WITH AUTO DIFFERENTIAL - Abnormal       Result Value    WBC 8.5      nRBC 0.0      RBC 3.99 (*)     Hemoglobin 12.4      Hematocrit 37.9      MCV 95      MCH 31.1      MCHC 32.7      RDW 13.8      Platelets 246       Neutrophils % 60.3      Immature Granulocytes %, Automated 0.4      Lymphocytes % 23.9      Monocytes % 8.8      Eosinophils % 5.9      Basophils % 0.7      Neutrophils Absolute 5.16      Immature Granulocytes Absolute, Automated 0.03      Lymphocytes Absolute 2.04      Monocytes Absolute 0.75      Eosinophils Absolute 0.50 (*)     Basophils Absolute 0.06     BASIC METABOLIC PANEL - Abnormal    Glucose 116 (*)     Sodium 140      Potassium 3.6      Chloride 109 (*)     Bicarbonate 26      Anion Gap 9 (*)     Urea Nitrogen 16      Creatinine 0.95      eGFR 57 (*)     Calcium 9.2          CT thoracic spine wo IV contrast   Final Result   Acute fracture of the right ribs 9 and 10.        No acute fracture or subluxation in the thoracic or lumbar spine.   Multilevel degenerative change.        Multiple pulmonary nodules. No further follow-up is required,   however, if the patient has high risk factors for primary lung   malignancy, follow-up noncontrast CT scan chest in 12 months may be   obtained. (Hugo MacMahon et al., Guidelines for management of   incidental pulmonary nodules detected on CT images: From the   Fleischner Society 2017, Radiology. 2017 Jul;284 (1):228-243.)        Signed by: Pietro Baig 5/11/2024 4:06 PM   Dictation workstation:   [id number]      CT lumbar spine wo IV contrast   Final Result   Acute fracture of the right ribs 9 and 10.        No acute fracture or subluxation in the thoracic or lumbar spine.   Multilevel degenerative change.        Multiple pulmonary nodules. No further follow-up is required,   however, if the patient has high risk factors for primary lung   malignancy, follow-up noncontrast CT scan chest in 12 months may be   obtained. (Hugo Hensonhogenet et al., Guidelines for management of   incidental pulmonary nodules detected on CT images: From the   Fleischner Society 2017, Radiology. 2017 Jul;284 (1):228-243.)        Signed by: Pietro Baig 5/11/2024 4:06 PM    Dictation workstation:   [id number]      CT chest abdomen pelvis w IV contrast   Final Result   Acute fracture of the right ribs 9 and 10.        No acute fracture or subluxation in the thoracic or lumbar spine.   Multilevel degenerative change.        Multiple pulmonary nodules. No further follow-up is required,   however, if the patient has high risk factors for primary lung   malignancy, follow-up noncontrast CT scan chest in 12 months may be   obtained. (Hugo Hammond et al., Guidelines for management of   incidental pulmonary nodules detected on CT images: From the   Fleischner Society 2017, Radiology. 2017 Jul;284 (1):228-243.)        Signed by: Pietro Baig 5/11/2024 4:06 PM   Dictation workstation:   [id number]      CT head wo IV contrast   Final Result   No acute intracranial abnormality. Consider follow-up with MRI as   warranted.             Signed by: Pietro Baig 5/11/2024 3:17 PM   Dictation workstation:   [id number]      CT cervical spine wo IV contrast   Final Result   No evidence for an acute fracture or subluxation of the cervical   spine.        Multilevel degenerative change in the spine.        Signed by: Pietro Baig 5/11/2024 3:20 PM   Dictation workstation:   [id number]          Procedure  Procedures     Jaret Anderson PA-C  05/11/24 4660

## 2025-02-01 ENCOUNTER — NURSING HOME VISIT (OUTPATIENT)
Dept: POST ACUTE CARE | Facility: EXTERNAL LOCATION | Age: OVER 89
End: 2025-02-01
Payer: COMMERCIAL

## 2025-02-01 DIAGNOSIS — I89.0 LYMPHEDEMA OF BOTH LOWER EXTREMITIES: ICD-10-CM

## 2025-02-01 DIAGNOSIS — E66.09 CLASS 1 OBESITY DUE TO EXCESS CALORIES WITH SERIOUS COMORBIDITY AND BODY MASS INDEX (BMI) OF 31.0 TO 31.9 IN ADULT: ICD-10-CM

## 2025-02-01 DIAGNOSIS — F02.A4 MILD LATE ONSET ALZHEIMER'S DEMENTIA WITH ANXIETY (MULTI): Primary | ICD-10-CM

## 2025-02-01 DIAGNOSIS — H54.7 VISION IMPAIRMENT: ICD-10-CM

## 2025-02-01 DIAGNOSIS — R26.2 AMBULATORY DYSFUNCTION: ICD-10-CM

## 2025-02-01 DIAGNOSIS — H91.13 PRESBYCUSIS OF BOTH EARS: ICD-10-CM

## 2025-02-01 DIAGNOSIS — N39.0 RECURRENT UTI: ICD-10-CM

## 2025-02-01 DIAGNOSIS — M15.9 OSTEOARTHRITIS OF MULTIPLE JOINTS, UNSPECIFIED OSTEOARTHRITIS TYPE: ICD-10-CM

## 2025-02-01 DIAGNOSIS — G30.1 MILD LATE ONSET ALZHEIMER'S DEMENTIA WITH ANXIETY (MULTI): Primary | ICD-10-CM

## 2025-02-01 DIAGNOSIS — H35.3290 EXUDATIVE AGE-RELATED MACULAR DEGENERATION, UNSPECIFIED LATERALITY, UNSPECIFIED STAGE (MULTI): ICD-10-CM

## 2025-02-01 DIAGNOSIS — E66.811 CLASS 1 OBESITY DUE TO EXCESS CALORIES WITH SERIOUS COMORBIDITY AND BODY MASS INDEX (BMI) OF 31.0 TO 31.9 IN ADULT: ICD-10-CM

## 2025-02-01 DIAGNOSIS — I10 HYPERTENSION, ESSENTIAL: ICD-10-CM

## 2025-02-01 DIAGNOSIS — G47.33 OSA ON CPAP: ICD-10-CM

## 2025-02-01 DIAGNOSIS — R48.0 DYSLEXIA: ICD-10-CM

## 2025-02-01 DIAGNOSIS — J44.9 CHRONIC OBSTRUCTIVE PULMONARY DISEASE, UNSPECIFIED COPD TYPE (MULTI): ICD-10-CM

## 2025-02-01 PROCEDURE — 99345 HOME/RES VST NEW HIGH MDM 75: CPT | Performed by: FAMILY MEDICINE

## 2025-02-01 NOTE — LETTER
Patient: Brynn Lyons  : 1932    Encounter Date: 2025    Admit H&P to [facility]    89 y.o. female admitted 25.  Previously at [facility]  HPI  Dissatisfied with service at [facility].  Fairly high functioning woman.  Has hearing and vision impairment due to age.  Uses hearing aides  Ambulates with a walker.      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  Raised 7 children.  .      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 (Multi) 2025   • GENNARO on CPAP 2025   • Osteoarthritis of multiple joints 2025   • Presbycusis of both ears 2025   • Recurrent UTI 2025   • Vision impairment 2025      Past Surgical History:   Procedure Laterality Date   • CARDIAC CATHETERIZATION      Cardiac catheterization   • CATARACT EXTRACTION EXTRACAPSULAR W/ INTRAOCULAR LENS IMPLANTATION      Cataract surgery   • CHOLECYSTECTOMY      Cholecystectomy laparoscopic   • HERNIA REPAIR      Hernia repair - unknown location   • INCONTINENCE SURGERY      Bladder surgery   • RECTAL SURGERY      Rectal surgery   • TONSILLECTOMY      Tonsillectomy   • VAGINAL HYSTERECTOMY W/ ANTERIOR AND POSTERIOR VAGINAL REPAIR      Anterior colporrhaphy     Family History   Family history unknown: Yes      Social History     Socioeconomic History   • Marital status:      Spouse name: Not on file   • Number of children: 7    • Years of education: 12   • Highest education level: High school graduate   Occupational History   • Not on file   Tobacco Use   • Smoking status: Never   • Smokeless tobacco: Never   Vaping Use   • Vaping status: Never Used   Substance and Sexual Activity   • Alcohol use: Not Currently   • Drug use: Not Currently   • Sexual activity: Not on file   Other Topics Concern   • Not on file   Social History Narrative   • Not on file     Social Drivers of Health     Financial Resource Strain: Not on file   Food Insecurity: Not on file   Transportation Needs: Not on file   Physical Activity: Not on file   Stress: Not on file   Social Connections: Not on file   Intimate Partner Violence: Not on file   Housing Stability: Unknown (3/20/2024)    Received from [facility]    Housing Stability Vital Sign    • Unable to Pay for Housing in the Last Year: No    • Number of Places Lived in the Last Year: Not on file    • In the last 12 months, was there a time when you did not have a steady place to sleep or slept in a shelter (including now)?: No       Current Outpatient Medications on File Prior to Visit   Medication Sig Dispense Refill   • atorvastatin (Lipitor) 40 mg tablet Take 1 tablet (40 mg) by mouth once daily at bedtime.     • bumetanide (Bumex) 1 mg tablet Take 1 tablet (1 mg) by mouth once daily.     • cyanocobalamin (Vitamin B-12) 1,000 mcg tablet Take 1 tablet (1,000 mcg) by mouth once daily.     • memantine (Namenda) 5 mg tablet Take 1 tablet (5 mg) by mouth 2 times a day.     • metoprolol succinate XL (Toprol-XL) 25 mg 24 hr tablet Take 1 tablet (25 mg) by mouth once daily.     • QUEtiapine (SEROquel) 25 mg tablet Take 0.5 tablets (12.5 mg) by mouth once daily at bedtime.     • aspirin 81 mg EC tablet Take 1 tablet (81 mg) by mouth once daily.     • calcium carbonate-vitamin D3 (Calcium 500 + D) 500 mg-10 mcg (400 unit) chewable tablet Chew 1 tablet 2 times a day.     • cholecalciferol (Vitamin D-3) 25 MCG  "(1000 UT) tablet Take 1 tablet (1,000 Units) by mouth once daily.     • cranberry extract 425 mg capsule Take 1 capsule by mouth once daily.     • lidocaine (Lidoderm) 5 % patch Place 1 patch over 12 hours on the skin once daily. Remove & discard patch within 12 hours or as directed by MD. 15 patch 0   • naloxone (Narcan) 4 mg/0.1 mL nasal spray Administer 1 spray (4 mg) into affected nostril(s) if needed for opioid reversal for up to 1 dose. May repeat every 2-3 minutes if needed, alternating nostrils, until medical assistance becomes available. 2 each 0   • pantoprazole (ProtoNix) 40 mg EC tablet Take 1 tablet (40 mg) by mouth once daily.     • vitamins A,C,E-zinc-copper (PreserVision AREDS) 4,296 mcg-226 mg-90 mg capsule Take 1 capsule by mouth twice a day.       No current facility-administered medications on file prior to visit.       Allergies   Allergen Reactions   • Sulfa (Sulfonamide Antibiotics) Itching and Swelling         ROS: Denies chest pain, SOB, Headache, GI problems     Visit Vitals  /63   Pulse 88   Temp 36.7 °C (98.1 °F)   Ht 1.676 m (5' 6\")   Wt 89.4 kg (197 lb)   BMI 31.80 kg/m²   OB Status Postmenopausal   Smoking Status Never   BSA 2.04 m²        PHYSICAL EXAM:  Alert and oriented to self and location.  Eyes: EOM grossly intact  Neck supple without lymph adenopathy or carotid bruit.  No masses or thyromegaly  Heart regular rate and rhythm without murmur.  Lungs clear to auscultation.  Legs with significant adipose and/or lymphedema.  Gait is non-antalgic with walker.  Slow/guarded  Speech clear.  Hearing adequate.      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

## 2025-02-02 VITALS
BODY MASS INDEX: 31.66 KG/M2 | DIASTOLIC BLOOD PRESSURE: 63 MMHG | WEIGHT: 197 LBS | HEART RATE: 88 BPM | TEMPERATURE: 98.1 F | HEIGHT: 66 IN | SYSTOLIC BLOOD PRESSURE: 121 MMHG

## 2025-02-02 PROBLEM — F02.A4 MILD LATE ONSET ALZHEIMER'S DEMENTIA WITH ANXIETY (MULTI): Status: ACTIVE | Noted: 2025-02-02

## 2025-02-02 PROBLEM — R48.0 DYSLEXIA: Status: ACTIVE | Noted: 2025-02-02

## 2025-02-02 PROBLEM — H35.3290 EXUDATIVE AGE-RELATED MACULAR DEGENERATION, UNSPECIFIED LATERALITY, UNSPECIFIED STAGE (MULTI): Status: ACTIVE | Noted: 2025-02-02

## 2025-02-02 PROBLEM — E66.811 CLASS 1 OBESITY DUE TO EXCESS CALORIES WITH SERIOUS COMORBIDITY AND BODY MASS INDEX (BMI) OF 31.0 TO 31.9 IN ADULT: Status: ACTIVE | Noted: 2025-02-02

## 2025-02-02 PROBLEM — G30.1 MILD LATE ONSET ALZHEIMER'S DEMENTIA WITH ANXIETY (MULTI): Status: ACTIVE | Noted: 2025-02-02

## 2025-02-02 PROBLEM — H91.13 PRESBYCUSIS OF BOTH EARS: Status: ACTIVE | Noted: 2025-02-02

## 2025-02-02 PROBLEM — I89.0 LYMPHEDEMA OF BOTH LOWER EXTREMITIES: Status: ACTIVE | Noted: 2025-02-02

## 2025-02-02 PROBLEM — R26.2 AMBULATORY DYSFUNCTION: Status: ACTIVE | Noted: 2025-02-02

## 2025-02-02 PROBLEM — H54.7 VISION IMPAIRMENT: Status: ACTIVE | Noted: 2025-02-02

## 2025-02-02 PROBLEM — J44.9 CHRONIC OBSTRUCTIVE PULMONARY DISEASE, UNSPECIFIED COPD TYPE (MULTI): Status: ACTIVE | Noted: 2025-02-02

## 2025-02-02 PROBLEM — E66.09 CLASS 1 OBESITY DUE TO EXCESS CALORIES WITH SERIOUS COMORBIDITY AND BODY MASS INDEX (BMI) OF 31.0 TO 31.9 IN ADULT: Status: ACTIVE | Noted: 2025-02-02

## 2025-02-02 PROBLEM — I10 HYPERTENSION, ESSENTIAL: Status: ACTIVE | Noted: 2025-02-02

## 2025-02-02 PROBLEM — G47.33 OSA ON CPAP: Status: ACTIVE | Noted: 2025-02-02

## 2025-02-02 PROBLEM — M15.9 OSTEOARTHRITIS OF MULTIPLE JOINTS: Status: ACTIVE | Noted: 2025-02-02

## 2025-02-02 PROBLEM — N39.0 RECURRENT UTI: Status: ACTIVE | Noted: 2025-02-02

## 2025-02-02 RX ORDER — DIMETHICONE 13 MG/ML
1 LOTION TOPICAL DAILY
COMMUNITY

## 2025-02-02 RX ORDER — BUMETANIDE 1 MG/1
1 TABLET ORAL
COMMUNITY
Start: 2023-10-10

## 2025-02-02 RX ORDER — LANOLIN ALCOHOL/MO/W.PET/CERES
1000 CREAM (GRAM) TOPICAL
COMMUNITY
Start: 2022-10-27

## 2025-02-02 RX ORDER — ATORVASTATIN CALCIUM 40 MG/1
1 TABLET, FILM COATED ORAL NIGHTLY
COMMUNITY
Start: 2022-05-27

## 2025-02-02 RX ORDER — CALCIUM CARBONATE/VITAMIN D3 500 MG-10
1 TABLET,CHEWABLE ORAL 2 TIMES DAILY
COMMUNITY

## 2025-02-02 RX ORDER — QUETIAPINE FUMARATE 25 MG/1
12.5 TABLET, FILM COATED ORAL NIGHTLY
COMMUNITY
Start: 2023-10-10

## 2025-02-02 RX ORDER — VIT A/VIT C/VIT E/ZINC/COPPER 4296-226
1 CAPSULE ORAL 2 TIMES DAILY
COMMUNITY

## 2025-02-02 RX ORDER — ASPIRIN 81 MG/1
81 TABLET ORAL DAILY
COMMUNITY

## 2025-02-02 RX ORDER — PANTOPRAZOLE SODIUM 40 MG/1
40 TABLET, DELAYED RELEASE ORAL
COMMUNITY

## 2025-02-02 RX ORDER — METOPROLOL SUCCINATE 25 MG/1
25 TABLET, EXTENDED RELEASE ORAL
COMMUNITY
Start: 2023-10-19

## 2025-02-02 RX ORDER — MEMANTINE HYDROCHLORIDE 5 MG/1
5 TABLET ORAL 2 TIMES DAILY
COMMUNITY
Start: 2024-02-28

## 2025-02-02 RX ORDER — CHOLECALCIFEROL (VITAMIN D3) 25 MCG
1000 TABLET ORAL DAILY
COMMUNITY

## 2025-02-03 NOTE — PROGRESS NOTES
Admit H&P to [facility]    89 y.o. female admitted 1/28/25.  Previously at [facility]  HPI  Dissatisfied with service at [facility].  Fairly high functioning woman.  Has hearing and vision impairment due to age.  Uses hearing aides  Ambulates with a walker.      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  Raised 7 children.  .      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 (Multi) 02/02/2025    GENNARO on CPAP 02/02/2025    Osteoarthritis of multiple joints 02/02/2025    Presbycusis of both ears 02/02/2025    Recurrent UTI 02/02/2025    Vision impairment 02/02/2025      Past Surgical History:   Procedure Laterality Date    CARDIAC CATHETERIZATION      Cardiac catheterization    CATARACT EXTRACTION EXTRACAPSULAR W/ INTRAOCULAR LENS IMPLANTATION      Cataract surgery    CHOLECYSTECTOMY      Cholecystectomy laparoscopic    HERNIA REPAIR      Hernia repair - unknown location    INCONTINENCE SURGERY      Bladder surgery    RECTAL SURGERY      Rectal surgery    TONSILLECTOMY      Tonsillectomy    VAGINAL HYSTERECTOMY W/ ANTERIOR AND POSTERIOR VAGINAL REPAIR      Anterior colporrhaphy     Family History   Family history unknown: Yes      Social History     Socioeconomic History    Marital status:      Spouse name: Not on file    Number of children: 7    Years of education: 12    Highest education level: High school graduate   Occupational  History    Not on file   Tobacco Use    Smoking status: Never    Smokeless tobacco: Never   Vaping Use    Vaping status: Never Used   Substance and Sexual Activity    Alcohol use: Not Currently    Drug use: Not Currently    Sexual activity: Not on file   Other Topics Concern    Not on file   Social History Narrative    Not on file     Social Drivers of Health     Financial Resource Strain: Not on file   Food Insecurity: Not on file   Transportation Needs: Not on file   Physical Activity: Not on file   Stress: Not on file   Social Connections: Not on file   Intimate Partner Violence: Not on file   Housing Stability: Unknown (3/20/2024)    Received from [facility]    Housing Stability Vital Sign     Unable to Pay for Housing in the Last Year: No     Number of Places Lived in the Last Year: Not on file     In the last 12 months, was there a time when you did not have a steady place to sleep or slept in a shelter (including now)?: No       Current Outpatient Medications on File Prior to Visit   Medication Sig Dispense Refill    atorvastatin (Lipitor) 40 mg tablet Take 1 tablet (40 mg) by mouth once daily at bedtime.      bumetanide (Bumex) 1 mg tablet Take 1 tablet (1 mg) by mouth once daily.      cyanocobalamin (Vitamin B-12) 1,000 mcg tablet Take 1 tablet (1,000 mcg) by mouth once daily.      memantine (Namenda) 5 mg tablet Take 1 tablet (5 mg) by mouth 2 times a day.      metoprolol succinate XL (Toprol-XL) 25 mg 24 hr tablet Take 1 tablet (25 mg) by mouth once daily.      QUEtiapine (SEROquel) 25 mg tablet Take 0.5 tablets (12.5 mg) by mouth once daily at bedtime.      aspirin 81 mg EC tablet Take 1 tablet (81 mg) by mouth once daily.      calcium carbonate-vitamin D3 (Calcium 500 + D) 500 mg-10 mcg (400 unit) chewable tablet Chew 1 tablet 2 times a day.      cholecalciferol (Vitamin D-3) 25 MCG (1000 UT) tablet Take 1 tablet (1,000 Units) by mouth once daily.      cranberry extract 425 mg capsule Take 1  "capsule by mouth once daily.      lidocaine (Lidoderm) 5 % patch Place 1 patch over 12 hours on the skin once daily. Remove & discard patch within 12 hours or as directed by MD. 15 patch 0    naloxone (Narcan) 4 mg/0.1 mL nasal spray Administer 1 spray (4 mg) into affected nostril(s) if needed for opioid reversal for up to 1 dose. May repeat every 2-3 minutes if needed, alternating nostrils, until medical assistance becomes available. 2 each 0    pantoprazole (ProtoNix) 40 mg EC tablet Take 1 tablet (40 mg) by mouth once daily.      vitamins A,C,E-zinc-copper (PreserVision AREDS) 4,296 mcg-226 mg-90 mg capsule Take 1 capsule by mouth twice a day.       No current facility-administered medications on file prior to visit.       Allergies   Allergen Reactions    Sulfa (Sulfonamide Antibiotics) Itching and Swelling         ROS: Denies chest pain, SOB, Headache, GI problems     Visit Vitals  /63   Pulse 88   Temp 36.7 °C (98.1 °F)   Ht 1.676 m (5' 6\")   Wt 89.4 kg (197 lb)   BMI 31.80 kg/m²   OB Status Postmenopausal   Smoking Status Never   BSA 2.04 m²        PHYSICAL EXAM:  Alert and oriented to self and location.  Eyes: EOM grossly intact  Neck supple without lymph adenopathy or carotid bruit.  No masses or thyromegaly  Heart regular rate and rhythm without murmur.  Lungs clear to auscultation.  Legs with significant adipose and/or lymphedema.  Gait is non-antalgic with walker.  Slow/guarded  Speech clear.  Hearing adequate.      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

## 2025-07-06 ENCOUNTER — NURSING HOME VISIT (OUTPATIENT)
Dept: POST ACUTE CARE | Facility: EXTERNAL LOCATION | Age: OVER 89
End: 2025-07-06
Payer: COMMERCIAL

## 2025-07-06 DIAGNOSIS — G30.1 MILD LATE ONSET ALZHEIMER'S DEMENTIA WITH ANXIETY (MULTI): ICD-10-CM

## 2025-07-06 DIAGNOSIS — F02.A4 MILD LATE ONSET ALZHEIMER'S DEMENTIA WITH ANXIETY (MULTI): ICD-10-CM

## 2025-07-06 DIAGNOSIS — R44.0 AUDITORY HALLUCINATION: ICD-10-CM

## 2025-07-06 DIAGNOSIS — N39.0 RECURRENT UTI: Primary | ICD-10-CM

## 2025-07-06 PROCEDURE — 99348 HOME/RES VST EST LOW MDM 30: CPT | Performed by: FAMILY MEDICINE

## 2025-07-06 NOTE — LETTER
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  Former Physician started Seroquel 12.5 mg at HS to possibly help.  Patient is not fearful of the music she hears at night as trying to fall asleep.      Walks with a walker;  feeds self;  large vocabulary    Electronically Signed By: Isaiah aRmon DO   25  9:32 PM

## 2025-07-07 NOTE — PROGRESS NOTES
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  Former Physician started Seroquel 12.5 mg at HS to possibly help.  Patient is not fearful of the music she hears at night as trying to fall asleep.      Walks with a walker;  feeds self;  large vocabulary